# Patient Record
Sex: MALE | Race: WHITE | NOT HISPANIC OR LATINO | Employment: FULL TIME | URBAN - NONMETROPOLITAN AREA
[De-identification: names, ages, dates, MRNs, and addresses within clinical notes are randomized per-mention and may not be internally consistent; named-entity substitution may affect disease eponyms.]

---

## 2018-02-04 ENCOUNTER — HISTORY (OUTPATIENT)
Dept: EMERGENCY MEDICINE | Facility: OTHER | Age: 28
End: 2018-02-04

## 2019-07-18 ENCOUNTER — TELEPHONE (OUTPATIENT)
Dept: FAMILY MEDICINE | Facility: OTHER | Age: 29
End: 2019-07-18

## 2019-07-18 ENCOUNTER — OFFICE VISIT (OUTPATIENT)
Dept: FAMILY MEDICINE | Facility: OTHER | Age: 29
End: 2019-07-18
Attending: NURSE PRACTITIONER
Payer: COMMERCIAL

## 2019-07-18 VITALS
TEMPERATURE: 97.7 F | RESPIRATION RATE: 16 BRPM | WEIGHT: 203.7 LBS | OXYGEN SATURATION: 97 % | HEART RATE: 66 BPM | HEIGHT: 75 IN | DIASTOLIC BLOOD PRESSURE: 72 MMHG | SYSTOLIC BLOOD PRESSURE: 120 MMHG | BODY MASS INDEX: 25.33 KG/M2

## 2019-07-18 DIAGNOSIS — L08.9 CAT BITE OF RIGHT HAND WITH INFECTION, INITIAL ENCOUNTER: Primary | ICD-10-CM
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
## 2019-07-18 DIAGNOSIS — S61.451A CAT BITE OF RIGHT HAND WITH INFECTION, INITIAL ENCOUNTER: Primary | ICD-10-CM

## 2019-07-18 LAB
ANION GAP SERPL CALCULATED.3IONS-SCNC: 10 MMOL/L (ref 3–14)
BASOPHILS # BLD AUTO: 0 10E9/L (ref 0–0.2)
BASOPHILS NFR BLD AUTO: 0.4 %
BUN SERPL-MCNC: 19 MG/DL (ref 7–25)
CALCIUM SERPL-MCNC: 9.5 MG/DL (ref 8.6–10.3)
CHLORIDE SERPL-SCNC: 101 MMOL/L (ref 98–107)
CO2 SERPL-SCNC: 26 MMOL/L (ref 21–31)
CREAT SERPL-MCNC: 1.11 MG/DL (ref 0.7–1.3)
CRP SERPL-MCNC: 0.2 MG/L
DIFFERENTIAL METHOD BLD: NORMAL
EOSINOPHIL # BLD AUTO: 0 10E9/L (ref 0–0.7)
EOSINOPHIL NFR BLD AUTO: 0.4 %
ERYTHROCYTE [DISTWIDTH] IN BLOOD BY AUTOMATED COUNT: 12.2 % (ref 10–15)
GFR SERPL CREATININE-BSD FRML MDRD: 77 ML/MIN/{1.73_M2}
GLUCOSE SERPL-MCNC: 121 MG/DL (ref 70–105)
HCT VFR BLD AUTO: 44.9 % (ref 40–53)
HGB BLD-MCNC: 16.3 G/DL (ref 13.3–17.7)
IMM GRANULOCYTES # BLD: 0 10E9/L (ref 0–0.4)
IMM GRANULOCYTES NFR BLD: 0.3 %
LYMPHOCYTES # BLD AUTO: 1.6 10E9/L (ref 0.8–5.3)
LYMPHOCYTES NFR BLD AUTO: 21.8 %
MCH RBC QN AUTO: 32.4 PG (ref 26.5–33)
MCHC RBC AUTO-ENTMCNC: 36.3 G/DL (ref 31.5–36.5)
MCV RBC AUTO: 89 FL (ref 78–100)
MONOCYTES # BLD AUTO: 0.5 10E9/L (ref 0–1.3)
MONOCYTES NFR BLD AUTO: 6.2 %
NEUTROPHILS # BLD AUTO: 5.2 10E9/L (ref 1.6–8.3)
NEUTROPHILS NFR BLD AUTO: 70.9 %
PLATELET # BLD AUTO: 211 10E9/L (ref 150–450)
POTASSIUM SERPL-SCNC: 3.9 MMOL/L (ref 3.5–5.1)
RBC # BLD AUTO: 5.03 10E12/L (ref 4.4–5.9)
SODIUM SERPL-SCNC: 137 MMOL/L (ref 134–144)
WBC # BLD AUTO: 7.3 10E9/L (ref 4–11)

## 2019-07-18 PROCEDURE — 36415 COLL VENOUS BLD VENIPUNCTURE: CPT | Mod: ZL | Performed by: NURSE PRACTITIONER

## 2019-07-18 PROCEDURE — 85025 COMPLETE CBC W/AUTO DIFF WBC: CPT | Mod: ZL | Performed by: NURSE PRACTITIONER

## 2019-07-18 PROCEDURE — 80048 BASIC METABOLIC PNL TOTAL CA: CPT | Mod: ZL | Performed by: NURSE PRACTITIONER

## 2019-07-18 PROCEDURE — 86140 C-REACTIVE PROTEIN: CPT | Mod: ZL | Performed by: NURSE PRACTITIONER

## 2019-07-18 PROCEDURE — 99214 OFFICE O/P EST MOD 30 MIN: CPT | Performed by: NURSE PRACTITIONER

## 2019-07-18 RX ORDER — DOXYCYCLINE 100 MG/1
100 CAPSULE ORAL 2 TIMES DAILY
Qty: 20 CAPSULE | Refills: 0 | Status: SHIPPED | OUTPATIENT
Start: 2019-07-18 | End: 2019-07-28

## 2019-07-18 RX ORDER — METRONIDAZOLE 500 MG/1
500 TABLET ORAL 3 TIMES DAILY
Qty: 30 TABLET | Refills: 0 | Status: SHIPPED | OUTPATIENT
Start: 2019-07-18 | End: 2019-07-28

## 2019-07-18 ASSESSMENT — PAIN SCALES - GENERAL: PAINLEVEL: MODERATE PAIN (4)

## 2019-07-18 ASSESSMENT — MIFFLIN-ST. JEOR: SCORE: 1964.61

## 2019-07-18 NOTE — NURSING NOTE
Patient in clinic for cat bite last night.  Tx by cleaning wound and applying A and D ointment.  Amrita Mathews LPN....................  7/18/2019   12:52 PM    Chief Complaint   Patient presents with     Cat Bite       Medication Reconciliation: complete    Amrita Mathews LPN

## 2019-07-18 NOTE — PATIENT INSTRUCTIONS
Low threshold to follow up. If worsening in 24-48 hours come back    Hot packs, rest, elevation    Start antibiotics as soon as you get them.      Cellulitis - Take the antibiotic as prescribed. Antibiotic has been sent to pharmacy. Please take full course of antibiotic even if symptoms have completely resolved. This helps prevent against antibiotic resistance.     Watch for any signs of increasing infection (redness, pus, increased pain (may be along the tendon and back of hand if the hand involved), increased swelling or fever). Call if any of these signs occur. Monitor for fevers or chills.    Wash the wound in warm water with antibacterial soap for 5-10 minutes at a time two to three times per day until healing is established. For puncture wounds limit antibiotic ointments. You can keep it covered with a bandage in order to decrease infection concerns.       Call or return to clinic as needed if your symptoms worsen or fail to improve as anticipated.

## 2019-07-18 NOTE — PROGRESS NOTES
Anesthesia Volume In Cc: 0 Nursing Notes:   Amrita Mathews LPN  7/18/2019  1:00 PM  Signed  Patient in clinic for cat bite last night.  Tx by cleaning wound and applying A and D ointment.  Amrita Mathews LPN....................  7/18/2019   12:52 PM    Chief Complaint   Patient presents with     Cat Bite       Medication Reconciliation: complete    Amrita Mathews LPN      SUBJECTIVE:   Kb Parnell is a 29 year old male who presents to clinic today for the following health issues:    Patient presents to the rapid clinic for a cat bite.  Last evening around 6 PM he was bit by his brother's cat.  Is a known cat to him.  The attack was provoked, the animal is hesitant to be around humans and he hides out underneath the couch.  The patient reached his arm in under the couch and the cat bit him.  The patient notes that the cat had had some shots or immunizations however the cat is never outside.  He washed the wound with soap and water and applied A&D over the sites.  He now presents with swelling, pain, increased redness to the dorsal surface of the hand.  He is able to flex and extend his fingers but they are painful.  He denies fevers or chills.      Problem list and histories reviewed & adjusted, as indicated.  Additional history: as documented    There is no problem list on file for this patient.    Past Surgical History:   Procedure Laterality Date     OTHER SURGICAL HISTORY      2002,600000,OTHER,involving closed reduction       Social History     Tobacco Use     Smoking status: Never Smoker     Smokeless tobacco: Former User   Substance Use Topics     Alcohol use: Yes     Family History   Problem Relation Age of Onset     Heart Disease Father 46        Heart Disease,MI     Hypertension Mother         Hypertension     Other - See Comments Mother         Psychiatric illness,Anxiety     Diabetes No family hx of         Diabetes     Cancer No family hx of         Cancer         Current Outpatient Medications   Medication Sig Dispense  "Refill     doxycycline hyclate (VIBRAMYCIN) 100 MG capsule Take 1 capsule (100 mg) by mouth 2 times daily for 10 days 20 capsule 0     metroNIDAZOLE (FLAGYL) 500 MG tablet Take 1 tablet (500 mg) by mouth 3 times daily for 10 days 30 tablet 0     Allergies   Allergen Reactions     Penicillins Anxiety     Nickel      metal         ROS:  Notable findings in the HPI.       OBJECTIVE:     /72 (BP Location: Left arm, Patient Position: Sitting, Cuff Size: Adult Regular)   Pulse 66   Temp 97.7  F (36.5  C) (Tympanic)   Resp 16   Ht 1.905 m (6' 3\")   Wt 92.4 kg (203 lb 11.2 oz)   SpO2 97%   BMI 25.46 kg/m    Body mass index is 25.46 kg/m .  GENERAL: healthy, alert and no distress  EYES: Eyes grossly normal to inspection  HENT: normal cephalic/atraumatic and oral mucous membranes moist  RESP: Without increased work of breathing  CV: regular rates and rhythm and no peripheral edema  MS: Right hand: He is able to flex, extend his wrist.  He is able to pronate and supinate the wrist.  He is able to flex and extend his pointer, middle, ring of fingers against resistance without pain or problems.  SKIN:  Right hand: Dorsal surface has several puncture wounds, there is an 8-1/2 cm red, swollen area.  See pictures for further description.  NEURO: Normal strength and tone, mentation intact and speech normal  PSYCH: mentation appears normal, affect normal/bright              Diagnostic Test Results:  Results for orders placed or performed in visit on 07/18/19 (from the past 24 hour(s))   CBC with platelets differential   Result Value Ref Range    WBC 7.3 4.0 - 11.0 10e9/L    RBC Count 5.03 4.4 - 5.9 10e12/L    Hemoglobin 16.3 13.3 - 17.7 g/dL    Hematocrit 44.9 40.0 - 53.0 %    MCV 89 78 - 100 fl    MCH 32.4 26.5 - 33.0 pg    MCHC 36.3 31.5 - 36.5 g/dL    RDW 12.2 10.0 - 15.0 %    Platelet Count 211 150 - 450 10e9/L    Diff Method Automated Method     % Neutrophils 70.9 %    % Lymphocytes 21.8 %    % Monocytes 6.2 %    % " Total Volume (Ccs): 1 Add Intralesional Injection: No Eosinophils 0.4 %    % Basophils 0.4 %    % Immature Granulocytes 0.3 %    Absolute Neutrophil 5.2 1.6 - 8.3 10e9/L    Absolute Lymphocytes 1.6 0.8 - 5.3 10e9/L    Absolute Monocytes 0.5 0.0 - 1.3 10e9/L    Absolute Eosinophils 0.0 0.0 - 0.7 10e9/L    Absolute Basophils 0.0 0.0 - 0.2 10e9/L    Abs Immature Granulocytes 0.0 0 - 0.4 10e9/L   Basic metabolic panel   Result Value Ref Range    Sodium 137 134 - 144 mmol/L    Potassium 3.9 3.5 - 5.1 mmol/L    Chloride 101 98 - 107 mmol/L    Carbon Dioxide 26 21 - 31 mmol/L    Anion Gap 10 3 - 14 mmol/L    Glucose 121 (H) 70 - 105 mg/dL    Urea Nitrogen 19 7 - 25 mg/dL    Creatinine 1.11 0.70 - 1.30 mg/dL    GFR Estimate 77 >60 mL/min/[1.73_m2]    GFR Estimate If Black >90 >60 mL/min/[1.73_m2]    Calcium 9.5 8.6 - 10.3 mg/dL   CRP inflammation   Result Value Ref Range    CRP Inflammation 0.2 <0.5 mg/L       ASSESSMENT/PLAN:     1. Cat bite of right hand with infection, initial encounter  - CBC with platelets differential  - Basic metabolic panel  - CRP inflammation  - doxycycline hyclate (VIBRAMYCIN) 100 MG capsule; Take 1 capsule (100 mg) by mouth 2 times daily for 10 days  Dispense: 20 capsule; Refill: 0  - metroNIDAZOLE (FLAGYL) 500 MG tablet; Take 1 tablet (500 mg) by mouth 3 times daily for 10 days  Dispense: 30 tablet; Refill: 0    Medical Decision Making:    Differential Diagnosis:  Rash: Cellulitis  Eczema  Cat bite with infection    Serious Comorbid Conditions:  Adult:  None    PLAN:    Did explain to the patient that cat bites can get out of hand.  It has not yet been 24 hours and here he has the start of an infection.  Will get him started on doxycycline with coadministration of Flagyl as he is allergic to penicillin.  Will have him wash the wound with antibacterial soap, limit antibiotic ointments as this can prevent the puncture wounds from draining.  He can also use hot compresses and elevation, ibuprofen and Tylenol for pain.  If things significantly worsen  Total Time In Minutes: 3 minutes in the next 24 to 48 hours he will return to the ER or rapid clinic.  Otherwise he will follow-up as needed.    Followup:    If not improving or if condition worsens, follow up with your Primary Care Provider    I explained my diagnostic considerations and recommendations to the patient, who voiced understanding and agreement with the treatment plan. All questions were answered. We discussed potential side effects of any prescribed or recommended therapies, as well as expectations for response to treatments.  He was advised to contact our office if there is no improvement or worsening of conditions or symptoms.  If s/s worsen or persist, patient will either come back or follow up with PCP.    Disclaimer:  This note consists of words and symbols derived from keyboarding, dictation, or using voice recognition software. As a result, there may be errors in the script that have gone undetected. Please consider this when interpreting information found in this note.      Radha Whitaker NP, 7/18/2019 1:00 PM          Consent was obtained from the patient. The risks and benefits to therapy were discussed in detail. Specifically, the risks of infection, scarring, bleeding, prolonged wound healing, incomplete removal, allergy to anesthesia, nerve injury and recurrence were addressed. Alternatives to liquid nitrogen, such as ED&C, surgical removal, XRT were also discussed.  Prior to the procedure, the treatment site was clearly identified and confirmed by the patient. All components of Universal Protocol/PAUSE Rule completed. Additional Information: (Optional): The wound was cleaned, and a dressing was applied.  The patient received detailed post-op instructions. Size Of Lesion In Cm: 1.3 Pre-Procedure: The surgical site was antiseptically prepared. Number Of Freeze-Thaw Cycles: 2 freeze-thaw cycles Post-Care Instructions: I reviewed with the patient in detail post-care instructions. Patient is to keep the area dry for 48 hours, and not to engage in any heavy lifting, exercise, or swimming for the next 14 days. Should the patient develop any fevers, chills, bleeding, severe pain patient will contact the office immediately. Bill As A Line Item Or As Units: Line Item Medication Injected: 5-Fluorouracil Detail Level: Detailed Render Post-Care In The Note: Yes

## 2020-02-12 ENCOUNTER — MYC MEDICAL ADVICE (OUTPATIENT)
Dept: FAMILY MEDICINE | Facility: OTHER | Age: 30
End: 2020-02-12

## 2020-02-12 ENCOUNTER — OFFICE VISIT (OUTPATIENT)
Dept: FAMILY MEDICINE | Facility: OTHER | Age: 30
End: 2020-02-12
Attending: NURSE PRACTITIONER
Payer: COMMERCIAL

## 2020-02-12 VITALS
WEIGHT: 193.38 LBS | HEIGHT: 75 IN | DIASTOLIC BLOOD PRESSURE: 68 MMHG | SYSTOLIC BLOOD PRESSURE: 116 MMHG | RESPIRATION RATE: 20 BRPM | TEMPERATURE: 97.9 F | HEART RATE: 93 BPM | BODY MASS INDEX: 24.05 KG/M2 | OXYGEN SATURATION: 97 %

## 2020-02-12 DIAGNOSIS — R00.2 HEART PALPITATIONS: Primary | ICD-10-CM

## 2020-02-12 DIAGNOSIS — F41.1 GENERALIZED ANXIETY DISORDER: ICD-10-CM

## 2020-02-12 LAB
ANION GAP SERPL CALCULATED.3IONS-SCNC: 5 MMOL/L (ref 3–14)
BUN SERPL-MCNC: 16 MG/DL (ref 7–25)
CALCIUM SERPL-MCNC: 9.9 MG/DL (ref 8.6–10.3)
CHLORIDE SERPL-SCNC: 102 MMOL/L (ref 98–107)
CO2 SERPL-SCNC: 30 MMOL/L (ref 21–31)
CREAT SERPL-MCNC: 1.14 MG/DL (ref 0.7–1.3)
GFR SERPL CREATININE-BSD FRML MDRD: 75 ML/MIN/{1.73_M2}
GLUCOSE SERPL-MCNC: 91 MG/DL (ref 70–105)
INTERPRETATION ECG - MUSE: NORMAL
MAGNESIUM SERPL-MCNC: 2.1 MG/DL (ref 1.9–2.7)
POTASSIUM SERPL-SCNC: 4.7 MMOL/L (ref 3.5–5.1)
SODIUM SERPL-SCNC: 137 MMOL/L (ref 134–144)

## 2020-02-12 PROCEDURE — 36415 COLL VENOUS BLD VENIPUNCTURE: CPT | Mod: ZL | Performed by: NURSE PRACTITIONER

## 2020-02-12 PROCEDURE — 99214 OFFICE O/P EST MOD 30 MIN: CPT | Performed by: NURSE PRACTITIONER

## 2020-02-12 PROCEDURE — 83735 ASSAY OF MAGNESIUM: CPT | Mod: ZL | Performed by: NURSE PRACTITIONER

## 2020-02-12 PROCEDURE — 93000 ELECTROCARDIOGRAM COMPLETE: CPT | Performed by: INTERNAL MEDICINE

## 2020-02-12 PROCEDURE — 80048 BASIC METABOLIC PNL TOTAL CA: CPT | Mod: ZL | Performed by: NURSE PRACTITIONER

## 2020-02-12 RX ORDER — DILTIAZEM HYDROCHLORIDE 30 MG/1
30 TABLET, FILM COATED ORAL 4 TIMES DAILY PRN
Qty: 30 TABLET | Refills: 0 | Status: SHIPPED | OUTPATIENT
Start: 2020-02-12 | End: 2022-04-14

## 2020-02-12 ASSESSMENT — PATIENT HEALTH QUESTIONNAIRE - PHQ9
SUM OF ALL RESPONSES TO PHQ QUESTIONS 1-9: 7
5. POOR APPETITE OR OVEREATING: MORE THAN HALF THE DAYS

## 2020-02-12 ASSESSMENT — ANXIETY QUESTIONNAIRES
2. NOT BEING ABLE TO STOP OR CONTROL WORRYING: SEVERAL DAYS
IF YOU CHECKED OFF ANY PROBLEMS ON THIS QUESTIONNAIRE, HOW DIFFICULT HAVE THESE PROBLEMS MADE IT FOR YOU TO DO YOUR WORK, TAKE CARE OF THINGS AT HOME, OR GET ALONG WITH OTHER PEOPLE: SOMEWHAT DIFFICULT
5. BEING SO RESTLESS THAT IT IS HARD TO SIT STILL: SEVERAL DAYS
7. FEELING AFRAID AS IF SOMETHING AWFUL MIGHT HAPPEN: SEVERAL DAYS
6. BECOMING EASILY ANNOYED OR IRRITABLE: NOT AT ALL
GAD7 TOTAL SCORE: 9
1. FEELING NERVOUS, ANXIOUS, OR ON EDGE: MORE THAN HALF THE DAYS
3. WORRYING TOO MUCH ABOUT DIFFERENT THINGS: MORE THAN HALF THE DAYS

## 2020-02-12 ASSESSMENT — ENCOUNTER SYMPTOMS
PALPITATIONS: 1
CHILLS: 0
FEVER: 0

## 2020-02-12 ASSESSMENT — PAIN SCALES - GENERAL: PAINLEVEL: NO PAIN (0)

## 2020-02-12 ASSESSMENT — MIFFLIN-ST. JEOR: SCORE: 1917.77

## 2020-02-12 NOTE — PROGRESS NOTES
"  SUBJECTIVE:   Kb Parnell is a 29 year old male who presents to clinic today for the following health issues:    HPI  Patient presents for evaluation of heart palpitations and elevated blood pressures. Reviewing some of his history he has been seen multiple times for palpitations. He has had normal EKGs, and chest x-ray showed normal cardiac silhouette. Had a 48 hour holter monitor completed on 12/23/19 at [facility] and showed no ventricular ectopy and normal sinus rhythm with heart rate average . Lab work also completed and showed normal TSH, HgbA1C, CBC and BMP. He notes family history with father and Grandpa passing away from heart attacks. He notes anxiety diagnosis and has been under a lot of stress with work. He reports recent stressor including  from his SO and work. He also notes his brother has depression so he is always trying to help him out.   Palpitations seemed more pronounced in the last week. He was sleeping and felt his heart quiver. He notes being hyper aware of heart sensations. \"It doesn't beat fast, but beats hard and I can feel it in my chest cavity.\" Nothing makes it worse. Nothing improves it. He is exercising daily. Notes that he went to the gym this morning and tolerated activity without symptoms. He notes decreasing his caffeine intake to once cup of coffee daily. Reports 1-2 beers 2-3 times a week. No smoking. No other illicit drug use. He is a / and notes though he loves his work, but it is a big stressor. He was previously on Celexa for anxiety and notes it helped overall, but does not want to go back on medication. Thinks overall anxiety is better controlled than the past. He often goes on [employer] to look at symptoms and makes him more anxious. He notes thoughts of suicide at times, but no plan or previous attempts. He just started seeing a counselor at [place], found it helpful and will be going every other week.   He got a wrist blood pressure " "cuff this week. He notes a high reading of 160/101. Notes blood pressure readings have been all over the place. It is normal in the clinic today @ 116/68.      There are no active problems to display for this patient.    History reviewed. No pertinent past medical history.   Past Surgical History:   Procedure Laterality Date     OTHER SURGICAL HISTORY      2002,600000,OTHER,involving closed reduction       Review of Systems   Constitutional: Negative for chills and fever.   Cardiovascular: Positive for palpitations. Negative for chest pain and peripheral edema.        OBJECTIVE:     /68 (BP Location: Right arm, Patient Position: Sitting, Cuff Size: Adult Regular)   Pulse 93   Temp 97.9  F (36.6  C) (Tympanic)   Resp 20   Ht 1.905 m (6' 3\")   Wt 87.7 kg (193 lb 6 oz)   SpO2 97%   BMI 24.17 kg/m    Body mass index is 24.17 kg/m .  Physical Exam  Constitutional:       Appearance: Normal appearance. He is normal weight.   HENT:      Mouth/Throat:      Mouth: Mucous membranes are moist.   Neck:      Musculoskeletal: Neck supple. No muscular tenderness.   Cardiovascular:      Rate and Rhythm: Regular rhythm. Tachycardia present.      Pulses: Normal pulses.      Heart sounds: No murmur. No friction rub.   Pulmonary:      Effort: Pulmonary effort is normal.      Breath sounds: Normal breath sounds.   Skin:     General: Skin is warm and dry.   Neurological:      Mental Status: He is alert.       Diagnostic Test Results:  Results for orders placed or performed in visit on 02/12/20 (from the past 24 hour(s))   EKG 12-lead, tracing only   Result Value Ref Range    Interpretation ECG Click View Image link to view waveform and result    Basic Metabolic Panel   Result Value Ref Range    Sodium 137 134 - 144 mmol/L    Potassium 4.7 3.5 - 5.1 mmol/L    Chloride 102 98 - 107 mmol/L    Carbon Dioxide 30 21 - 31 mmol/L    Anion Gap 5 3 - 14 mmol/L    Glucose 91 70 - 105 mg/dL    Urea Nitrogen 16 7 - 25 mg/dL    Creatinine " 1.14 0.70 - 1.30 mg/dL    GFR Estimate 75 >60 mL/min/[1.73_m2]    GFR Estimate If Black >90 >60 mL/min/[1.73_m2]    Calcium 9.9 8.6 - 10.3 mg/dL   Magnesium   Result Value Ref Range    Magnesium 2.1 1.9 - 2.7 mg/dL       ASSESSMENT/PLAN:   1. Heart palpitations  We discussed at length next steps. I discussed that it is reassuring that previous work up has not pointed to a cardiac cause. It is likely this is anxiety driven. EKG NSR and electrolytes today were normal. We opted to not repeat TSH or CBC as this was completed less than 2 months ago. We will try a small dose of cardizem when patient is feeling symptomatic with heart palpitations. Start with a small dose of 15 mg when symptomatic can repeat up to 4 times a day or every 6 hours. Patient wanted to hold up on further cardiac testing (MCOT for 2 weeks, echo, or cardiology consult) at this time due to cost. He will monitor his symptoms closely at home. I also suggested getting a BP device that is not on his wrist, but for his upper arm. As I think he may be getting false readings. He will check this once a day and let me know his results next week. I encouraged him to establish care with a provider so we can better manage his healthcare. Continue with exercise. Watch caffeine intake. Monitor stress. Follow-up in 4 weeks to see how cardizem is working.   - Magnesium; Future  - Basic Metabolic Panel; Future  - EKG 12-lead, tracing only  - diltiazem (CARDIZEM) 30 MG tablet; Take 1 tablet (30 mg) by mouth 4 times daily as needed (heart palpitations.)  Dispense: 30 tablet; Refill: 0  - Basic Metabolic Panel  - Magnesium    2. Generalized anxiety disorder  Continue with counseling. We discussed when palpitations develop to focus on his breathing and less on his heart. We discussed other stress relieving modalities to help with anxiety. He is not interested in medication at this time.     I spent approximately 25 minutes with the patient (exclusive of separately  billed services/procedures), with greater than 50% spent in counseling, prognosis, risks and benefits of management or follow-up.  Reviewed importance of compliance with chosen treatment options and follow-up.  Risk factor reduction and patient education and coordinating care, establishing and/or reviewing the patient's medical record also completed during today's exam .      Antionette Ahn [facility] AND [facility]

## 2020-02-12 NOTE — NURSING NOTE
Patient presents to clinic experiencing hypertension and heart palpitations.  He states he wore a heart monitor at [facility] 3 months ago and results were alright.    Medication Reconciliation: complete    Eli Davis LPN

## 2020-02-13 ASSESSMENT — ANXIETY QUESTIONNAIRES: GAD7 TOTAL SCORE: 9

## 2020-02-13 NOTE — TELEPHONE ENCOUNTER
Order placed for next week placement of device. We will call to schedule. Also should try the cardizem if symptomatic to see if symptoms improve.

## 2020-02-13 NOTE — TELEPHONE ENCOUNTER
Notified patient of below response per Antionette Ahn NP and he verbally understood.    Let patient know via phone call.     Not likely a lung issue based on your symptoms. Usually lung issues worsen with a deep breath, Previous chest x-ray from 12/2020 showed clear lungs is reassuring.   TAVIA Shah LPN............2/13/2020 4:16 PM    
[Patient] : patient

## 2020-02-13 NOTE — TELEPHONE ENCOUNTER
Patient notified of response below per Antionette Ahn NP.  He will return to [place] next Thursday 02/20/20 and would like to follow up and schedule Cardiac Holter monitor at that time.    It sounds like he will be leaving in two days for [place]. If he doesn't want to wear the heart monitor while in [place] I recommend that he have it placed when he gets back, so we can get more information than just 2 days. Let me know his response and I can order the study.   Antionette Ahn NP     Medication Reconciliation: complete    Eli Davis LPN

## 2020-03-11 ENCOUNTER — HEALTH MAINTENANCE LETTER (OUTPATIENT)
Age: 30
End: 2020-03-11

## 2020-07-01 ENCOUNTER — VIRTUAL VISIT (OUTPATIENT)
Dept: FAMILY MEDICINE | Facility: OTHER | Age: 30
End: 2020-07-01
Attending: FAMILY MEDICINE
Payer: COMMERCIAL

## 2020-07-01 DIAGNOSIS — J02.9 SORE THROAT: Primary | ICD-10-CM

## 2020-07-01 PROCEDURE — 99212 OFFICE O/P EST SF 10 MIN: CPT | Mod: 95 | Performed by: FAMILY MEDICINE

## 2020-07-01 ASSESSMENT — PAIN SCALES - GENERAL: PAINLEVEL: NO PAIN (0)

## 2020-07-01 NOTE — NURSING NOTE
"Chief Complaint   Patient presents with     Pharyngitis     Patient was vomiting yesterday and developed a sore throat today. He is unsure if it could be heat related or if he should be tested for covid.    Initial There were no vitals taken for this visit. Estimated body mass index is 24.17 kg/m  as calculated from the following:    Height as of 2/12/20: 1.905 m (6' 3\").    Weight as of 2/12/20: 87.7 kg (193 lb 6 oz).    Medication Reconciliation: complete      Phoebe Ballesteros LPN  "

## 2020-07-01 NOTE — PROGRESS NOTES
"Kb Parnell is a 29 year old male who is being evaluated via a billable telephone visit.      The patient has been notified of following: Phoebe Ballesteros LPN .......  7/1/2020  1:38 PM      \"This telephone visit will be conducted via a call between you and your physician/provider. We have found that certain health care needs can be provided without the need for a physical exam.  This service lets us provide the care you need with a short phone conversation.  If a prescription is necessary we can send it directly to your pharmacy.  If lab work is needed we can place an order for that and you can then stop by our lab to have the test done at a later time.    Telephone visits are billed at different rates depending on your insurance coverage. During this emergency period, for some insurers they may be billed the same as an in-person visit.  Please reach out to your insurance provider with any questions.    If during the course of the call the physician/provider feels a telephone visit is not appropriate, you will not be charged for this service.\"    Patient has given verbal consent for Telephone visit?  Yes    What phone number would you like to be contacted at? [phone]    How would you like to obtain your AVS? Karolyn Palomares     Kb Parnell is a 29 year old male who presents via phone visit today for the following health issues:    HPI patient calls concerned about cold symptoms.  Few days ago he was out in the sun quite a bit became very hot.  Couple days later he developed a sore throat.  Little nausea.  Vomited once.  Otherwise he has no other symptoms.  No temperatures greater than 100 worsening cough difficulty breathing or myalgias.  Initially did have a headache after he been outside in the sun but that has since resolved no chest pain diarrhea or loss of taste.                 Reviewed and updated as needed this visit by Provider         Review of Systems          Objective   Reported " vitals:  There were no vitals taken for this visit.     PSYCH: Alert and oriented times 3; coherent speech, normal   rate and volume, able to articulate logical thoughts, able   to abstract reason, no tangential thoughts, no hallucinations   or delusions  His affect is   RESP: No cough, no audible wheezing, able to talk in full sentences  Remainder of exam unable to be completed due to telephone visits            Assessment/Plan:  1. Sore throat  I advised the patient that he needs to criteria for COVID testing.  Currently cold with testing is not indicated according to the most recent recommendations.  He is to call if he should develop any other symptoms.      No follow-ups on file.      Phone call duration: 6 minutes    Obi Fuentes MD

## 2020-09-14 ENCOUNTER — OFFICE VISIT (OUTPATIENT)
Dept: FAMILY MEDICINE | Facility: OTHER | Age: 30
End: 2020-09-14
Attending: PHYSICIAN ASSISTANT
Payer: COMMERCIAL

## 2020-09-14 VITALS
BODY MASS INDEX: 23.87 KG/M2 | RESPIRATION RATE: 18 BRPM | TEMPERATURE: 97.8 F | SYSTOLIC BLOOD PRESSURE: 134 MMHG | HEART RATE: 51 BPM | DIASTOLIC BLOOD PRESSURE: 80 MMHG | WEIGHT: 191 LBS | OXYGEN SATURATION: 94 %

## 2020-09-14 DIAGNOSIS — R00.2 PALPITATIONS: Primary | ICD-10-CM

## 2020-09-14 DIAGNOSIS — F41.9 ANXIETY: ICD-10-CM

## 2020-09-14 DIAGNOSIS — K21.9 GASTROESOPHAGEAL REFLUX DISEASE, ESOPHAGITIS PRESENCE NOT SPECIFIED: ICD-10-CM

## 2020-09-14 PROBLEM — F63.3 TRICHOTILLOMANIA: Status: ACTIVE | Noted: 2017-11-17

## 2020-09-14 PROCEDURE — 99213 OFFICE O/P EST LOW 20 MIN: CPT | Performed by: PHYSICIAN ASSISTANT

## 2020-09-14 ASSESSMENT — ANXIETY QUESTIONNAIRES
GAD7 TOTAL SCORE: 8
1. FEELING NERVOUS, ANXIOUS, OR ON EDGE: MORE THAN HALF THE DAYS
2. NOT BEING ABLE TO STOP OR CONTROL WORRYING: SEVERAL DAYS
6. BECOMING EASILY ANNOYED OR IRRITABLE: NOT AT ALL
5. BEING SO RESTLESS THAT IT IS HARD TO SIT STILL: SEVERAL DAYS
IF YOU CHECKED OFF ANY PROBLEMS ON THIS QUESTIONNAIRE, HOW DIFFICULT HAVE THESE PROBLEMS MADE IT FOR YOU TO DO YOUR WORK, TAKE CARE OF THINGS AT HOME, OR GET ALONG WITH OTHER PEOPLE: SOMEWHAT DIFFICULT
3. WORRYING TOO MUCH ABOUT DIFFERENT THINGS: SEVERAL DAYS
7. FEELING AFRAID AS IF SOMETHING AWFUL MIGHT HAPPEN: SEVERAL DAYS

## 2020-09-14 ASSESSMENT — PAIN SCALES - GENERAL: PAINLEVEL: MILD PAIN (2)

## 2020-09-14 ASSESSMENT — PATIENT HEALTH QUESTIONNAIRE - PHQ9
SUM OF ALL RESPONSES TO PHQ QUESTIONS 1-9: 2
5. POOR APPETITE OR OVEREATING: MORE THAN HALF THE DAYS

## 2020-09-14 ASSESSMENT — MIFFLIN-ST. JEOR: SCORE: 1902

## 2020-09-14 NOTE — PROGRESS NOTES
SUBJECTIVE:   Kb Parnell is a 30 year old male here for the following health issues:    HPI  Patient presents clinic for evaluation of epigastric discomfort/chest heaviness and possible palpitations.  He describes a sensation of his heart fluttering for a brief moment perhaps 1 or 2 beats followed by a sensation of chest heaviness and anxiety.  He states that his palpitations occur approximately twice a day.  He does have a history of anxiety in the past and was on antidepressants as well as benzodiazepines for anxiety for about a year and a half.  He has been off the medications for nearly a year and does not feel this is anxiety although he has noted increased stress at work and due to the COVID-19 pandemic.  He also describes a metallic taste in his mouth after larger meals and especially when eating late at night such as 9 PM and going to bed at 930 or 10:00.  He reports worsening of his chest heaviness and a sensation of needing to take a deep breath while laying supine at night.  He has not tried Tums or any acid suppression medications.  He has not noted any dyspnea/cyanosis although states sometimes he feels the need to take a deep breath.  He denies any dizziness, vertigo, or tachycardia.  He states he wore a heart monitor through [facility] in the remote past however it kept falling off and the results were thought to be reassuring but inconclusive.  He did not follow-up with cardiology.  He has had no numbness or tingling in the extremities.  He denies any symptoms when he is exercising such as running.  He denies any recent sick contacts, fevers, chills, night sweats, change in his weight, abdominal pain or pressure, or back pain.  He states his symptoms are worse especially if he drinks several beers such as 5 or 6 later in the evening.  He has also noted waking up at approximately 3 PM and difficulty going back to sleep as his mind is racing and worried about his epigastric discomfort and what it  "might mean.  He states his sleep is much improved if he exercises more, does yoga, and eats less processed foods.    GAD7  MAXWELL-7 SCORE 2/12/2020 9/14/2020   Total Score 9 8       PHQ9  PHQ 2/12/2020 9/14/2020   PHQ-9 Total Score 7 2   Q9: Thoughts of better off dead/self-harm past 2 weeks Several days Not at all       Allergies:  Allergies   Allergen Reactions     Penicillins Anxiety     Nickel      metal       Review of Systems   As above otherwise ROS is unremarkable.     OBJECTIVE:     Vitals:    09/14/20 1524   BP: 134/80   BP Location: Right arm   Patient Position: Sitting   Cuff Size: Adult Large   Pulse: 51   Resp: 18   Temp: 97.8  F (36.6  C)   TempSrc: Tympanic   SpO2: 94%   Weight: 86.6 kg (191 lb)   Height: (P) 1.905 m (6' 3\")       Physical Exam  General Appearance: Pleasant, alert, appropriate appearance for age and circumstances, no acute distress  Head: Normocephalic, atraumatic  Eyes: PERRL, EOMI  Ears: TM's pearly gray and intact bilaterally. Normal auditory canals and external ears   OroPharynx: Dental hygiene adequate. Normal buccal mucosa. Normal pharynx. No exudates or petechia noted.  Neck: Supple, no masses or lymphadenopathy. Thyroid smooth and rubbery in texture without palpable nodules  Lungs: Normal chest wall and respirations. Clear to auscultation, no wheezes, rales, rhonchi, or stridor  Cardiovascular: Bradycardic at a rate of 51, regular rhythm. S1 and S2 audible, no murmurs, clicks, rubs, or gallops  Gastrointestinal: Abd symmetrical, soft, nontender, no masses, guarding, or tympany, normoactive bowel sounds  Psychiatric Exam: Alert and oriented, appropriate affect    ASSESSMENT/PLAN:     1. Palpitations    2. Gastroesophageal reflux disease, esophagitis presence not specified    3. Anxiety        I suspect his symptoms are likely it is a mix of anxiety acid reflux, and possible palpitations/bradycardic episodes.  I believe with a few diet and lifestyle changes most of these symptoms " will improve.    For his palpitations, as he was bradycardic at a rate of 51 today, I recommended a cardiac monitor to be worn for 48 hours.     For his anxiety we discussed in length possibly restarting an antidepressant such as an SSRI.  He states he still has some lorazepam at home but he feels he has not needed to use it.  He stated he would prefer not to restart any antidepressants or antianxiety medications at this time.  He states he will increase his yoga and exercise.  He will monitor symptoms and follow-up if needed.    For his acid reflux I suggested either starting a PPI, trying Tums, or changing his diet, especially not eating so late at night.  Patient states he wants to first change his diet.  Currently his caffeine intake is minimal however he states he will cut out all caffeine.  He does not do any energy drinks.  He also will eat less spicy foods.  He states he will also increase his yoga and exercise to see if this helps with his acid reflux.  He will follow-up after making these diet lifestyle changes if symptoms persist or worsen.    If no improvement after diet lifestyle changes I would recommend an EGD to monitor for acid reflux and also investigate for H. pylori.    If anxiety seems to be an ongoing factor would revisit restarting anxiety/depression medication.    We will follow-up after heart monitor results.    Orders Placed This Encounter   Procedures     RESPIRATORY THERAPY REFERRAL     Cardiac Mobile Telemetry Monitor       HERIBERTO Ramos  [facility] AND [facility]    This document was prepared using voice generated software.  While every attempt was made for accuracy, grammatical errors may exist.

## 2020-09-14 NOTE — NURSING NOTE
"Chief Complaint   Patient presents with     Breathing Problem   Patient says he has some heart beats that are stronger than others. He has mid/upper back pain and has questions about his diaphragm and esophagus .    Initial /80 (BP Location: Right arm, Patient Position: Sitting, Cuff Size: Adult Large)   Pulse 51   Temp 97.8  F (36.6  C) (Tympanic)   Resp 18   Ht (P) 1.905 m (6' 3\")   Wt 86.6 kg (191 lb)   SpO2 94%   BMI (P) 23.87 kg/m   Estimated body mass index is 23.87 kg/m  (pended) as calculated from the following:    Height as of this encounter: (P) 1.905 m (6' 3\").    Weight as of this encounter: 86.6 kg (191 lb).  Medication Reconciliation: complete    Joleen Herbert LPN  "

## 2020-09-15 ASSESSMENT — ANXIETY QUESTIONNAIRES: GAD7 TOTAL SCORE: 8

## 2020-09-17 ENCOUNTER — HOSPITAL ENCOUNTER (OUTPATIENT)
Dept: CARDIOLOGY | Facility: OTHER | Age: 30
Discharge: HOME | End: 2020-09-17
Attending: PHYSICIAN ASSISTANT | Admitting: PHYSICIAN ASSISTANT
Payer: COMMERCIAL

## 2020-09-17 DIAGNOSIS — R00.2 PALPITATIONS: Primary | ICD-10-CM

## 2020-09-17 PROCEDURE — 93227 XTRNL ECG REC<48 HR R&I: CPT | Performed by: INTERNAL MEDICINE

## 2020-09-17 PROCEDURE — 93225 XTRNL ECG REC<48 HRS REC: CPT

## 2020-10-22 ENCOUNTER — OFFICE VISIT (OUTPATIENT)
Dept: FAMILY MEDICINE | Facility: OTHER | Age: 30
End: 2020-10-22
Attending: FAMILY MEDICINE
Payer: COMMERCIAL

## 2020-10-22 VITALS
OXYGEN SATURATION: 98 % | WEIGHT: 187 LBS | HEART RATE: 64 BPM | DIASTOLIC BLOOD PRESSURE: 64 MMHG | SYSTOLIC BLOOD PRESSURE: 94 MMHG | TEMPERATURE: 98 F | BODY MASS INDEX: 23.37 KG/M2 | RESPIRATION RATE: 16 BRPM

## 2020-10-22 DIAGNOSIS — R14.0 BLOATING SYMPTOM: Primary | ICD-10-CM

## 2020-10-22 LAB
ALBUMIN SERPL-MCNC: 5.1 G/DL (ref 3.5–5.7)
ALP SERPL-CCNC: 62 U/L (ref 34–104)
ALT SERPL W P-5'-P-CCNC: 20 U/L (ref 7–52)
ANION GAP SERPL CALCULATED.3IONS-SCNC: 7 MMOL/L (ref 3–14)
AST SERPL W P-5'-P-CCNC: 19 U/L (ref 13–39)
BASOPHILS # BLD AUTO: 0 10E9/L (ref 0–0.2)
BASOPHILS NFR BLD AUTO: 0.6 %
BILIRUB SERPL-MCNC: 0.7 MG/DL (ref 0.3–1)
BUN SERPL-MCNC: 20 MG/DL (ref 7–25)
CALCIUM SERPL-MCNC: 9.9 MG/DL (ref 8.6–10.3)
CHLORIDE SERPL-SCNC: 100 MMOL/L (ref 98–107)
CO2 SERPL-SCNC: 31 MMOL/L (ref 21–31)
CREAT SERPL-MCNC: 1.02 MG/DL (ref 0.7–1.3)
DIFFERENTIAL METHOD BLD: NORMAL
EOSINOPHIL # BLD AUTO: 0.1 10E9/L (ref 0–0.7)
EOSINOPHIL NFR BLD AUTO: 1.1 %
ERYTHROCYTE [DISTWIDTH] IN BLOOD BY AUTOMATED COUNT: 12.3 % (ref 10–15)
GFR SERPL CREATININE-BSD FRML MDRD: 85 ML/MIN/{1.73_M2}
GLUCOSE SERPL-MCNC: 101 MG/DL (ref 70–105)
HCT VFR BLD AUTO: 45.8 % (ref 40–53)
HGB BLD-MCNC: 16 G/DL (ref 13.3–17.7)
IMM GRANULOCYTES # BLD: 0 10E9/L (ref 0–0.4)
IMM GRANULOCYTES NFR BLD: 0.2 %
LIPASE SERPL-CCNC: 33 U/L (ref 11–82)
LYMPHOCYTES # BLD AUTO: 2 10E9/L (ref 0.8–5.3)
LYMPHOCYTES NFR BLD AUTO: 30.7 %
MCH RBC QN AUTO: 31.7 PG (ref 26.5–33)
MCHC RBC AUTO-ENTMCNC: 34.9 G/DL (ref 31.5–36.5)
MCV RBC AUTO: 91 FL (ref 78–100)
MONOCYTES # BLD AUTO: 0.4 10E9/L (ref 0–1.3)
MONOCYTES NFR BLD AUTO: 5.6 %
NEUTROPHILS # BLD AUTO: 4.1 10E9/L (ref 1.6–8.3)
NEUTROPHILS NFR BLD AUTO: 61.8 %
PLATELET # BLD AUTO: 240 10E9/L (ref 150–450)
POTASSIUM SERPL-SCNC: 4.5 MMOL/L (ref 3.5–5.1)
PROT SERPL-MCNC: 7.7 G/DL (ref 6.4–8.9)
RBC # BLD AUTO: 5.04 10E12/L (ref 4.4–5.9)
SODIUM SERPL-SCNC: 138 MMOL/L (ref 134–144)
WBC # BLD AUTO: 6.6 10E9/L (ref 4–11)

## 2020-10-22 PROCEDURE — 36415 COLL VENOUS BLD VENIPUNCTURE: CPT | Mod: ZL | Performed by: FAMILY MEDICINE

## 2020-10-22 PROCEDURE — 99214 OFFICE O/P EST MOD 30 MIN: CPT | Performed by: FAMILY MEDICINE

## 2020-10-22 PROCEDURE — 83690 ASSAY OF LIPASE: CPT | Mod: ZL | Performed by: FAMILY MEDICINE

## 2020-10-22 PROCEDURE — 80053 COMPREHEN METABOLIC PANEL: CPT | Mod: ZL | Performed by: FAMILY MEDICINE

## 2020-10-22 PROCEDURE — 85025 COMPLETE CBC W/AUTO DIFF WBC: CPT | Mod: ZL | Performed by: FAMILY MEDICINE

## 2020-10-22 ASSESSMENT — PAIN SCALES - GENERAL: PAINLEVEL: NO PAIN (0)

## 2020-10-22 NOTE — NURSING NOTE
Patient here for possible hernia he has multiple symptoms dull ache, abdominal discomfort, gas, pressure and bloating no relief form antacids, he has had 2 Holter monitors. Medication Reconciliation: complete.    Ciara Wilkes LPN  10/22/2020 4:09 PM

## 2020-10-24 ASSESSMENT — ENCOUNTER SYMPTOMS
NAUSEA: 0
HEARTBURN: 1
VOMITING: 0
CHOKING: 0
DIZZINESS: 0
ANAL BLEEDING: 0
UNEXPECTED WEIGHT CHANGE: 0
FEVER: 1
ABDOMINAL DISTENTION: 1
APPETITE CHANGE: 0
DIARRHEA: 0
ABDOMINAL PAIN: 1
HEMATOCHEZIA: 0
FATIGUE: 0
ACTIVITY CHANGE: 0
CHEST TIGHTNESS: 0
DIFFICULTY URINATING: 0

## 2020-10-24 NOTE — PROGRESS NOTES
"  SUBJECTIVE:   Kb Parnell is a 30 year old male who presents to clinic today for the following health issues: Hiatal hernia symptoms    Patient arrives here for hiatal hernia symptoms.  He is complaining of abdominal pain discomfort dull ache.  He reports gas.  Bloating.  Pressure.  And night sweats.  He states he has been on the Internet and is concerned about a hiatal hernia.  He has had times in the past with some minimal relief.  Symptoms have been going on for 2 years.  He also has a history of palpitations and has been on a Holter monitor x2 without any clear reason for his palpitations.  He states that he has been to a chiropractor who has \"pulled his hernia down\".  He had not noticed any improvement.  Usually symptoms are worse after eating.  He gets gas through the night pressure.  At times symptoms will go away with laying in bed.        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 No pertinent past medical history.   Past Surgical History:   Procedure Laterality Date     OTHER SURGICAL HISTORY      2002,600000,OTHER,involving closed reduction       Review of Systems   Constitutional: Positive for fever. Negative for activity change, appetite change, fatigue and unexpected weight change.   Respiratory: Negative for choking and chest tightness.    Gastrointestinal: Positive for abdominal distention, abdominal pain and heartburn. Negative for anal bleeding, diarrhea, hematochezia, nausea and vomiting.   Genitourinary: Negative for difficulty urinating.   Neurological: Negative for dizziness.        OBJECTIVE:     BP 94/64   Pulse 64   Temp 98  F (36.7  C)   Resp 16   Wt 84.8 kg (187 lb)   SpO2 98%   BMI (P) 23.37 kg/m    Body mass index is 23.37 kg/m  (pended).  Physical Exam  Constitutional:       Appearance: Normal appearance.   HENT:      Head: Normocephalic. "   Cardiovascular:      Rate and Rhythm: Normal rate and regular rhythm.   Pulmonary:      Effort: Pulmonary effort is normal.      Breath sounds: Normal breath sounds.   Abdominal:      General: Abdomen is flat. There is no distension.      Palpations: There is no mass.      Tenderness: There is no abdominal tenderness.      Hernia: No hernia is present.   Neurological:      Mental Status: He is alert.         Diagnostic Test Results:  Results for orders placed or performed in visit on 10/22/20   Lipase     Status: None   Result Value Ref Range    Lipase 33 11 - 82 U/L   Comprehensive Metabolic Panel     Status: None   Result Value Ref Range    Sodium 138 134 - 144 mmol/L    Potassium 4.5 3.5 - 5.1 mmol/L    Chloride 100 98 - 107 mmol/L    Carbon Dioxide 31 21 - 31 mmol/L    Anion Gap 7 3 - 14 mmol/L    Glucose 101 70 - 105 mg/dL    Urea Nitrogen 20 7 - 25 mg/dL    Creatinine 1.02 0.70 - 1.30 mg/dL    GFR Estimate 85 >60 mL/min/[1.73_m2]    GFR Estimate If Black >90 >60 mL/min/[1.73_m2]    Calcium 9.9 8.6 - 10.3 mg/dL    Bilirubin Total 0.7 0.3 - 1.0 mg/dL    Albumin 5.1 3.5 - 5.7 g/dL    Protein Total 7.7 6.4 - 8.9 g/dL    Alkaline Phosphatase 62 34 - 104 U/L    ALT 20 7 - 52 U/L    AST 19 13 - 39 U/L   CBC and Differential     Status: None   Result Value Ref Range    WBC 6.6 4.0 - 11.0 10e9/L    RBC Count 5.04 4.4 - 5.9 10e12/L    Hemoglobin 16.0 13.3 - 17.7 g/dL    Hematocrit 45.8 40.0 - 53.0 %    MCV 91 78 - 100 fl    MCH 31.7 26.5 - 33.0 pg    MCHC 34.9 31.5 - 36.5 g/dL    RDW 12.3 10.0 - 15.0 %    Platelet Count 240 150 - 450 10e9/L    Diff Method Automated Method     % Neutrophils 61.8 %    % Lymphocytes 30.7 %    % Monocytes 5.6 %    % Eosinophils 1.1 %    % Basophils 0.6 %    % Immature Granulocytes 0.2 %    Absolute Neutrophil 4.1 1.6 - 8.3 10e9/L    Absolute Lymphocytes 2.0 0.8 - 5.3 10e9/L    Absolute Monocytes 0.4 0.0 - 1.3 10e9/L    Absolute Eosinophils 0.1 0.0 - 0.7 10e9/L    Absolute Basophils 0.0  0.0 - 0.2 10e9/L    Abs Immature Granulocytes 0.0 0 - 0.4 10e9/L     I had a long discussion with the patient that he certainly could have a hiatal hernia but he did have a chest x-ray recently showing no evidence of hiatal hernia.  I discussed that if he does not see very small.  And would recommend over-the-counter Prilosec initially.  Not feel he is in need or a candidate for scoping him.  Especially with labs being unremarkable.  Reassurance is given.  Follow-up if any changes should occur.           1. Bloating symptom    - CBC and Differential; Future  - Comprehensive Metabolic Panel; Future  - Lipase; Future  - Lipase  - Comprehensive Metabolic Panel  - CBC and Differential      Obi Fuentes MD  [facility] AND [facility]

## 2021-01-03 ENCOUNTER — HEALTH MAINTENANCE LETTER (OUTPATIENT)
Age: 31
End: 2021-01-03

## 2021-04-12 ENCOUNTER — NURSE TRIAGE (OUTPATIENT)
Dept: FAMILY MEDICINE | Facility: OTHER | Age: 31
End: 2021-04-12

## 2021-04-12 NOTE — TELEPHONE ENCOUNTER
"S-(situation): Pt removed 2 ticks Saturday and c/o swelling and itching.     B-(background): Last tetanus booster 5/19/2009.    A-(assessment): Tick #1: deer tick,  in \"butt crack\", likely attached 4/6, removed 4/11, Tick #2: deer tick, behind left knee, likely attached AND removed 4/11. Both sites swollen and itchy. Both ticks removed in entirety and alive after removal- kept in plastic baggy. Denies: fever, headache, joint discomfort, red rash or ring    R-(recommendations): Protocol recommends Pt be seen today in office, due to probable deer tick possibly attached >36 hours, to consider antibiotic prophylaxis. Pt requesting prescription without being seen.     Eli Roche RN .............. 4/12/2021  9:23 AM      ________________________________________________________    Additional Information    Negative: Not a tick bite    Negative: Patient sounds very sick or weak to the triager    Negative: Fever or severe headache occurs, 2 to 14 days following the bite    Negative: Widespread rash occurs, 2 to 14 days following the bite    Negative: Can't remove live tick (after using Care Advice)    Negative: Can't remove tick's head that was broken off in the skin (after using Care Advice)    Negative: Fever and area is red    Negative: Fever and area is very tender to touch    Negative: Red streak or red line and length > 2 inches (5 cm)    Negative: Red ring or bull's-eye rash occurs around a deer tick bite    Probable deer tick that was attached > 36 hours (or tick appears swollen, not flat)    Negative: Patient wants to be seen    Protocols used: TICK BITE-A-OH      "

## 2021-04-12 NOTE — TELEPHONE ENCOUNTER
Patient returned call and asked if he decides to be seen and is prescribed doxycycline, would this affect him getting his 2nd COVID vaccine on Thursday, as this is his priority. Routing to Dr. Fuentes, to advise. Eli Roche RN .............. 4/12/2021  11:09 AM

## 2021-04-12 NOTE — TELEPHONE ENCOUNTER
Called and spoke to Patient after verifying last name and date of birth. Pt notified of Dr. Fuentes's response/recommendation to be seen today in office. After options and risks involved were discussed and Pt's questions were answered, Pt verbalized plan to monitor for additional symptoms, and schedule appointment or present to [facility] as needed. Eli Roche RN .............. 4/12/2021  11:04 AM

## 2021-04-25 ENCOUNTER — HEALTH MAINTENANCE LETTER (OUTPATIENT)
Age: 31
End: 2021-04-25

## 2021-10-09 ENCOUNTER — HEALTH MAINTENANCE LETTER (OUTPATIENT)
Age: 31
End: 2021-10-09

## 2022-04-14 ENCOUNTER — OFFICE VISIT (OUTPATIENT)
Dept: FAMILY MEDICINE | Facility: OTHER | Age: 32
End: 2022-04-14
Attending: PEDIATRICS
Payer: COMMERCIAL

## 2022-04-14 VITALS
HEART RATE: 62 BPM | SYSTOLIC BLOOD PRESSURE: 130 MMHG | BODY MASS INDEX: 23 KG/M2 | DIASTOLIC BLOOD PRESSURE: 72 MMHG | TEMPERATURE: 97.8 F | OXYGEN SATURATION: 99 % | WEIGHT: 184 LBS | RESPIRATION RATE: 16 BRPM

## 2022-04-14 DIAGNOSIS — Z11.3 SCREEN FOR STD (SEXUALLY TRANSMITTED DISEASE): Primary | ICD-10-CM

## 2022-04-14 LAB
C TRACH DNA SPEC QL PROBE+SIG AMP: NEGATIVE
N GONORRHOEA DNA SPEC QL NAA+PROBE: NEGATIVE

## 2022-04-14 PROCEDURE — 87491 CHLMYD TRACH DNA AMP PROBE: CPT | Mod: ZL | Performed by: FAMILY MEDICINE

## 2022-04-14 PROCEDURE — 99213 OFFICE O/P EST LOW 20 MIN: CPT | Performed by: FAMILY MEDICINE

## 2022-04-14 ASSESSMENT — PAIN SCALES - GENERAL: PAINLEVEL: NO PAIN (0)

## 2022-04-14 NOTE — NURSING NOTE
Patient here for STD check, no symptoms just would like to be tested. Medication Reconciliation: complete.    Ciara Wilkes LPN  4/14/2022 10:51 AM

## 2022-04-14 NOTE — PROGRESS NOTES
Assessment & Plan     Screen for STD (sexually transmitted disease)  Results for orders placed or performed in visit on 04/14/22   GC/Chlamydia by PCR     Status: Normal    Specimen: Urine, Voided   Result Value Ref Range    Chlamydia Trachomatis Negative Negative    Neisseria gonorrhoeae Negative Negative    Narrative    Assay performed using [employer] real-time, reverse-transcriptase PCR.   Testing negative.  Continue surveillance as needed    - GC/Chlamydia by PCR; Future  - GC/Chlamydia by PCR               No follow-ups on file.    Obi Fuentes MD  [facility] AND [facility]   Kb is a 31 year old who presents for the following health issues STD check     Patient arrives here for STD check.  He reports he just wants to be screened.  He has no symptoms.  No known exposures.  Reports no lesions on his penis.    STD    History of Present Illness       Reason for visit:  STI/STD Screening  Symptoms include:  None    He eats 2-3 servings of fruits and vegetables daily.He consumes 0 sweetened beverage(s) daily.He exercises with enough effort to increase his heart rate 30 to 60 minutes per day.  He exercises with enough effort to increase his heart rate 4 days per week.   He is taking medications regularly.             Review of Systems         Objective    /72   Pulse 62   Temp 97.8  F (36.6  C)   Resp 16   Wt 83.5 kg (184 lb)   SpO2 99%   BMI (P) 23.00 kg/m    Body mass index is 23 kg/m  (pended).  Physical Exam  Constitutional:       Appearance: Normal appearance.   Neurological:      Mental Status: He is alert.   Psychiatric:         Mood and Affect: Mood normal.         Thought Content: Thought content normal.

## 2023-06-23 ENCOUNTER — OFFICE VISIT (OUTPATIENT)
Dept: FAMILY MEDICINE | Facility: OTHER | Age: 33
End: 2023-06-23
Attending: NURSE PRACTITIONER
Payer: COMMERCIAL

## 2023-06-23 VITALS
RESPIRATION RATE: 16 BRPM | HEIGHT: 75 IN | OXYGEN SATURATION: 98 % | BODY MASS INDEX: 23.95 KG/M2 | WEIGHT: 192.6 LBS | TEMPERATURE: 97.9 F | HEART RATE: 64 BPM | SYSTOLIC BLOOD PRESSURE: 130 MMHG | DIASTOLIC BLOOD PRESSURE: 80 MMHG

## 2023-06-23 DIAGNOSIS — Z82.49 FH: PREMATURE CORONARY HEART DISEASE: ICD-10-CM

## 2023-06-23 DIAGNOSIS — Z00.00 ROUTINE GENERAL MEDICAL EXAMINATION AT A HEALTH CARE FACILITY: Primary | ICD-10-CM

## 2023-06-23 DIAGNOSIS — E78.2 ELEVATED CHOLESTEROL WITH ELEVATED TRIGLYCERIDES: ICD-10-CM

## 2023-06-23 DIAGNOSIS — Z11.3 SCREEN FOR STD (SEXUALLY TRANSMITTED DISEASE): ICD-10-CM

## 2023-06-23 LAB
ALBUMIN SERPL BCG-MCNC: 5.1 G/DL (ref 3.5–5.2)
ALP SERPL-CCNC: 81 U/L (ref 40–129)
ALT SERPL W P-5'-P-CCNC: 19 U/L (ref 0–70)
ANION GAP SERPL CALCULATED.3IONS-SCNC: 13 MMOL/L (ref 7–15)
AST SERPL W P-5'-P-CCNC: 23 U/L (ref 0–45)
BASOPHILS # BLD AUTO: 0 10E3/UL (ref 0–0.2)
BASOPHILS NFR BLD AUTO: 1 %
BILIRUB SERPL-MCNC: 0.5 MG/DL
BUN SERPL-MCNC: 16.7 MG/DL (ref 6–20)
C TRACH DNA SPEC QL PROBE+SIG AMP: NEGATIVE
CALCIUM SERPL-MCNC: 9.2 MG/DL (ref 8.6–10)
CHLORIDE SERPL-SCNC: 103 MMOL/L (ref 98–107)
CHOLEST SERPL-MCNC: 253 MG/DL
CREAT SERPL-MCNC: 1.01 MG/DL (ref 0.67–1.17)
DEPRECATED HCO3 PLAS-SCNC: 25 MMOL/L (ref 22–29)
EOSINOPHIL # BLD AUTO: 0.1 10E3/UL (ref 0–0.7)
EOSINOPHIL NFR BLD AUTO: 1 %
ERYTHROCYTE [DISTWIDTH] IN BLOOD BY AUTOMATED COUNT: 12.4 % (ref 10–15)
GFR SERPL CREATININE-BSD FRML MDRD: >90 ML/MIN/1.73M2
GLUCOSE SERPL-MCNC: 97 MG/DL (ref 70–99)
HCT VFR BLD AUTO: 47.1 % (ref 40–53)
HDLC SERPL-MCNC: 56 MG/DL
HGB BLD-MCNC: 16.8 G/DL (ref 13.3–17.7)
IMM GRANULOCYTES # BLD: 0 10E3/UL
IMM GRANULOCYTES NFR BLD: 0 %
LDLC SERPL CALC-MCNC: 151 MG/DL
LYMPHOCYTES # BLD AUTO: 1.7 10E3/UL (ref 0.8–5.3)
LYMPHOCYTES NFR BLD AUTO: 30 %
MCH RBC QN AUTO: 32.8 PG (ref 26.5–33)
MCHC RBC AUTO-ENTMCNC: 35.7 G/DL (ref 31.5–36.5)
MCV RBC AUTO: 92 FL (ref 78–100)
MONOCYTES # BLD AUTO: 0.3 10E3/UL (ref 0–1.3)
MONOCYTES NFR BLD AUTO: 6 %
N GONORRHOEA DNA SPEC QL NAA+PROBE: NEGATIVE
NEUTROPHILS # BLD AUTO: 3.6 10E3/UL (ref 1.6–8.3)
NEUTROPHILS NFR BLD AUTO: 62 %
NONHDLC SERPL-MCNC: 197 MG/DL
NRBC # BLD AUTO: 0 10E3/UL
NRBC BLD AUTO-RTO: 0 /100
PLATELET # BLD AUTO: 228 10E3/UL (ref 150–450)
POTASSIUM SERPL-SCNC: 4.3 MMOL/L (ref 3.4–5.3)
PROT SERPL-MCNC: 7.9 G/DL (ref 6.4–8.3)
RBC # BLD AUTO: 5.12 10E6/UL (ref 4.4–5.9)
SODIUM SERPL-SCNC: 141 MMOL/L (ref 136–145)
TRIGL SERPL-MCNC: 230 MG/DL
WBC # BLD AUTO: 5.7 10E3/UL (ref 4–11)

## 2023-06-23 PROCEDURE — 87591 N.GONORRHOEAE DNA AMP PROB: CPT | Mod: ZL | Performed by: NURSE PRACTITIONER

## 2023-06-23 PROCEDURE — 90471 IMMUNIZATION ADMIN: CPT | Performed by: NURSE PRACTITIONER

## 2023-06-23 PROCEDURE — 90715 TDAP VACCINE 7 YRS/> IM: CPT | Performed by: NURSE PRACTITIONER

## 2023-06-23 PROCEDURE — 85025 COMPLETE CBC W/AUTO DIFF WBC: CPT | Mod: ZL | Performed by: NURSE PRACTITIONER

## 2023-06-23 PROCEDURE — 80061 LIPID PANEL: CPT | Mod: ZL | Performed by: NURSE PRACTITIONER

## 2023-06-23 PROCEDURE — 36415 COLL VENOUS BLD VENIPUNCTURE: CPT | Mod: ZL | Performed by: NURSE PRACTITIONER

## 2023-06-23 PROCEDURE — 86803 HEPATITIS C AB TEST: CPT | Mod: ZL | Performed by: NURSE PRACTITIONER

## 2023-06-23 PROCEDURE — 86780 TREPONEMA PALLIDUM: CPT | Mod: ZL | Performed by: NURSE PRACTITIONER

## 2023-06-23 PROCEDURE — 87389 HIV-1 AG W/HIV-1&-2 AB AG IA: CPT | Mod: ZL | Performed by: NURSE PRACTITIONER

## 2023-06-23 PROCEDURE — 99395 PREV VISIT EST AGE 18-39: CPT | Mod: 25 | Performed by: NURSE PRACTITIONER

## 2023-06-23 PROCEDURE — 80053 COMPREHEN METABOLIC PANEL: CPT | Mod: ZL | Performed by: NURSE PRACTITIONER

## 2023-06-23 PROCEDURE — 87491 CHLMYD TRACH DNA AMP PROBE: CPT | Mod: ZL | Performed by: NURSE PRACTITIONER

## 2023-06-23 ASSESSMENT — ANXIETY QUESTIONNAIRES
6. BECOMING EASILY ANNOYED OR IRRITABLE: SEVERAL DAYS
5. BEING SO RESTLESS THAT IT IS HARD TO SIT STILL: NOT AT ALL
GAD7 TOTAL SCORE: 7
4. TROUBLE RELAXING: SEVERAL DAYS
1. FEELING NERVOUS, ANXIOUS, OR ON EDGE: NEARLY EVERY DAY
IF YOU CHECKED OFF ANY PROBLEMS ON THIS QUESTIONNAIRE, HOW DIFFICULT HAVE THESE PROBLEMS MADE IT FOR YOU TO DO YOUR WORK, TAKE CARE OF THINGS AT HOME, OR GET ALONG WITH OTHER PEOPLE: SOMEWHAT DIFFICULT
7. FEELING AFRAID AS IF SOMETHING AWFUL MIGHT HAPPEN: SEVERAL DAYS
2. NOT BEING ABLE TO STOP OR CONTROL WORRYING: NOT AT ALL
GAD7 TOTAL SCORE: 7
7. FEELING AFRAID AS IF SOMETHING AWFUL MIGHT HAPPEN: SEVERAL DAYS
8. IF YOU CHECKED OFF ANY PROBLEMS, HOW DIFFICULT HAVE THESE MADE IT FOR YOU TO DO YOUR WORK, TAKE CARE OF THINGS AT HOME, OR GET ALONG WITH OTHER PEOPLE?: SOMEWHAT DIFFICULT
GAD7 TOTAL SCORE: 7
3. WORRYING TOO MUCH ABOUT DIFFERENT THINGS: SEVERAL DAYS

## 2023-06-23 ASSESSMENT — PAIN SCALES - GENERAL: PAINLEVEL: NO PAIN (0)

## 2023-06-23 NOTE — PROGRESS NOTES
SUBJECTIVE:   CC: Kb is an 32 year old who presents for preventative health visit.       Healthy Habits:     Getting at least 3 servings of Calcium per day:  NO    Bi-annual eye exam:  NO    Dental care twice a year:  Yes    Sleep apnea or symptoms of sleep apnea:  None    Diet:  Regular (no restrictions)    Frequency of exercise:  1 day/week    Duration of exercise:  30-45 minutes    Taking medications regularly:  Yes    Medication side effects:  Not applicable    PHQ-2 Total Score: 0    Additional concerns today:  Yes    He comes in today for annual physical.  He reports strong family history of early heart disease, his dad and grandfather both passed in their 40s and 50s due to sudden cardiac disease.  He works really hard at being physically active, eating healthy but sometimes his work situation does not allow him to eat healthy on a regular basis.  He does report he is under quite a bit of stress through work, does have anxiety.    He would like to have STD screening.  He reports 5 female sexual partners since his last screening.  He does not use condoms.  Denies any current symptoms at this        Today's PHQ-2 Score:       6/23/2023     3:12 PM   PHQ-2 ( 1999 [employer])   Q1: Little interest or pleasure in doing things 0   Q2: Feeling down, depressed or hopeless 0   PHQ-2 Score 0   Q1: Little interest or pleasure in doing things Not at all   Q2: Feeling down, depressed or hopeless Not at all   PHQ-2 Score 0       Have you ever done Advance Care Planning? (For example, a Health Directive, POLST, or a discussion with a medical provider or your loved ones about your wishes):     Social History     Tobacco Use     Smoking status: Never     Smokeless tobacco: Former   Substance Use Topics     Alcohol use: Yes     Comment: 3 a day              6/23/2023     3:12 PM   Alcohol Use   Prescreen: >3 drinks/day or >7 drinks/week? Yes   AUDIT SCORE  10       Last PSA: No results found for: PSA    Reviewed orders with  patient. Reviewed health maintenance and updated orders accordingly - Yes  BP Readings from Last 3 Encounters:   06/23/23 130/80   04/14/22 130/72   10/22/20 94/64    Wt Readings from Last 3 Encounters:   06/23/23 87.4 kg (192 lb 9.6 oz)   04/14/22 83.5 kg (184 lb)   10/22/20 84.8 kg (187 lb)                  Patient Active Problem List   Diagnosis     Trichotillomania     Anxiety     Past Surgical History:   Procedure Laterality Date     OTHER SURGICAL HISTORY      2002,600000,OTHER,involving closed reduction       Social History     Tobacco Use     Smoking status: Never     Smokeless tobacco: Former   Substance Use Topics     Alcohol use: Yes     Comment: 3 a day      Family History   Problem Relation Age of Onset     Heart Disease Father 46        Heart Disease,MI     Hypertension Mother         Hypertension     Other - See Comments Mother         Psychiatric illness,Anxiety     Depression Brother      Alcoholism Brother      Myocardial Infarction Paternal Grandfather      Diabetes No family hx of         Diabetes     Cancer No family hx of         Cancer         No current outpatient medications on file.     Allergies   Allergen Reactions     Penicillins Anxiety     Nickel      metal       Reviewed and updated as needed this visit by clinical staff   Tobacco  Allergies  Meds      Soc Hx        Reviewed and updated as needed this visit by Provider                 History reviewed. No pertinent past medical history.   Past Surgical History:   Procedure Laterality Date     OTHER SURGICAL HISTORY      2002,600000,OTHER,involving closed reduction       Review of Systems  CONSTITUTIONAL: NEGATIVE for fever, chills, change in weight  INTEGUMENTARY/SKIN: back lesion for several years  EYES: NEGATIVE for vision changes or irritation, no recent eye appointment  ENT: NEGATIVE for ear, mouth and throat problems  RESP: NEGATIVE for significant cough or SOB  CV: occasional left side chest pain  GI: occasional llq pain    "male: negative for dysuria, hematuria, decreased urinary stream, erectile dysfunction, urethral discharge  MUSCULOSKELETAL: NEGATIVE for significant arthralgias or myalgia  NEURO: NEGATIVE for weakness, dizziness or paresthesias  PSYCHIATRIC: NEGATIVE for changes in mood or affect    OBJECTIVE:   /80   Pulse 64   Temp 97.9  F (36.6  C)   Resp 16   Ht 1.905 m (6' 3\")   Wt 87.4 kg (192 lb 9.6 oz)   SpO2 98%   BMI 24.07 kg/m      Physical Exam  GENERAL: healthy, alert and no distress  EYES: Eyes grossly normal to inspection, PERRL and conjunctivae and sclerae normal  HENT: ear canals and TM's normal, nose and mouth without ulcers or lesions  NECK: no adenopathy, no asymmetry, masses, or scars and thyroid normal to palpation  RESP: lungs clear to auscultation - no rales, rhonchi or wheezes  CV: regular rate and rhythm, normal S1 S2, no S3 or S4, no murmur, click or rub, no peripheral edema and peripheral pulses strong  ABDOMEN: soft, nontender, no hepatosplenomegaly, no masses and bowel sounds normal  MS: no gross musculoskeletal defects noted, no edema  SKIN: no suspicious lesions or rashes  NEURO: Normal strength and tone, mentation intact and speech normal  PSYCH: mentation appears normal, affect normal/bright    Diagnostic Test Results:  Labs reviewed in Epic  Results for orders placed or performed in visit on 06/23/23   Comprehensive Metabolic Panel     Status: Normal   Result Value Ref Range    Sodium 141 136 - 145 mmol/L    Potassium 4.3 3.4 - 5.3 mmol/L    Chloride 103 98 - 107 mmol/L    Carbon Dioxide (CO2) 25 22 - 29 mmol/L    Anion Gap 13 7 - 15 mmol/L    Urea Nitrogen 16.7 6.0 - 20.0 mg/dL    Creatinine 1.01 0.67 - 1.17 mg/dL    Calcium 9.2 8.6 - 10.0 mg/dL    Glucose 97 70 - 99 mg/dL    Alkaline Phosphatase 81 40 - 129 U/L    AST 23 0 - 45 U/L    ALT 19 0 - 70 U/L    Protein Total 7.9 6.4 - 8.3 g/dL    Albumin 5.1 3.5 - 5.2 g/dL    Bilirubin Total 0.5 <=1.2 mg/dL    GFR Estimate >90 >60 " mL/min/1.73m2   Lipid Panel     Status: Abnormal   Result Value Ref Range    Cholesterol 253 (H) <200 mg/dL    Triglycerides 230 (H) <150 mg/dL    Direct Measure HDL 56 >=40 mg/dL    LDL Cholesterol Calculated 151 (H) <=100 mg/dL    Non HDL Cholesterol 197 (H) <130 mg/dL    Narrative    Cholesterol  Desirable:  <200 mg/dL    Triglycerides  Normal:  Less than 150 mg/dL  Borderline High:  150-199 mg/dL  High:  200-499 mg/dL  Very High:  Greater than or equal to 500 mg/dL    Direct Measure HDL  Female:  Greater than or equal to 50 mg/dL   Male:  Greater than or equal to 40 mg/dL    LDL Cholesterol  Desirable:  <100mg/dL  Above Desirable:  100-129 mg/dL   Borderline High:  130-159 mg/dL   High:  160-189 mg/dL   Very High:  >= 190 mg/dL    Non HDL Cholesterol  Desirable:  130 mg/dL  Above Desirable:  130-159 mg/dL  Borderline High:  160-189 mg/dL  High:  190-219 mg/dL  Very High:  Greater than or equal to 220 mg/dL   Treponema Ab w Reflex to RPR and Titer     Status: Normal   Result Value Ref Range    Treponema Antibody Total Nonreactive Nonreactive   CBC with platelets and differential     Status: None   Result Value Ref Range    WBC Count 5.7 4.0 - 11.0 10e3/uL    RBC Count 5.12 4.40 - 5.90 10e6/uL    Hemoglobin 16.8 13.3 - 17.7 g/dL    Hematocrit 47.1 40.0 - 53.0 %    MCV 92 78 - 100 fL    MCH 32.8 26.5 - 33.0 pg    MCHC 35.7 31.5 - 36.5 g/dL    RDW 12.4 10.0 - 15.0 %    Platelet Count 228 150 - 450 10e3/uL    % Neutrophils 62 %    % Lymphocytes 30 %    % Monocytes 6 %    % Eosinophils 1 %    % Basophils 1 %    % Immature Granulocytes 0 %    NRBCs per 100 WBC 0 <1 /100    Absolute Neutrophils 3.6 1.6 - 8.3 10e3/uL    Absolute Lymphocytes 1.7 0.8 - 5.3 10e3/uL    Absolute Monocytes 0.3 0.0 - 1.3 10e3/uL    Absolute Eosinophils 0.1 0.0 - 0.7 10e3/uL    Absolute Basophils 0.0 0.0 - 0.2 10e3/uL    Absolute Immature Granulocytes 0.0 <=0.4 10e3/uL    Absolute NRBCs 0.0 10e3/uL   GC/Chlamydia by PCR     Status: Normal     Specimen: Urine, Voided   Result Value Ref Range    Chlamydia Trachomatis Negative Negative    Neisseria gonorrhoeae Negative Negative    Narrative    Assay performed using [employer] real-time, reverse-transcriptase PCR.   CBC and Differential     Status: None    Narrative    The following orders were created for panel order CBC and Differential.  Procedure                               Abnormality         Status                     ---------                               -----------         ------                     CBC with platelets and d...[[id number]]                      Final result                 Please view results for these tests on the individual orders.       ASSESSMENT/PLAN:       ICD-10-CM    1. Routine general medical examination at a health care facility  Z00.00 Lipid Panel     Comprehensive Metabolic Panel     CBC and Differential     CBC and Differential     Comprehensive Metabolic Panel     Lipid Panel      2. Screen for STD (sexually transmitted disease)  Z11.3 GC/Chlamydia by PCR     Hepatitis C Screen Reflex to HCV RNA Quant and Genotype     Treponema Ab w Reflex to RPR and Titer     HIV Antigen Antibody Combo     HIV Antigen Antibody Combo     Treponema Ab w Reflex to RPR and Titer     Hepatitis C Screen Reflex to HCV RNA Quant and Genotype     GC/Chlamydia by PCR      3. FH: premature coronary heart disease  Z82.49 Lipid Panel     Comprehensive Metabolic Panel     CBC and Differential     CBC and Differential     Comprehensive Metabolic Panel     Lipid Panel        STD screening completed, negative thus far.  Lipid panel, CMP, CBC obtained.  Lipid panel is elevated, remainder of labs are stable.  Given elevated cholesterol panel, strong family history, recommend referral over to internal medicine for further evaluation.    [place] updated today.    Patient has been advised of split billing requirements and indicates understanding: Yes      COUNSELING:   Reviewed preventive health counseling,  as reflected in patient instructions       Regular exercise       Healthy diet/nutrition       Vision screening       Safe sex practices/STD prevention       Consider Hep C screening for all patients one time for ages 18-79 years       Syphilis screening for high risk patients        HIV screeninx in teen years, 1x in adult years, and at intervals if high risk        He reports that he has never smoked. He has quit using smokeless tobacco.        JOVANNA Guillermo [facility]  Answers for HPI/ROS submitted by the patient on 2023  MAXWELL 7 TOTAL SCORE: 7

## 2023-06-25 ENCOUNTER — TELEPHONE (OUTPATIENT)
Dept: FAMILY MEDICINE | Facility: OTHER | Age: 33
End: 2023-06-25
Payer: COMMERCIAL

## 2023-06-25 LAB — T PALLIDUM AB SER QL: NONREACTIVE

## 2023-06-25 NOTE — TELEPHONE ENCOUNTER
Please call patient to ask what kind of questions he has. JOVANNA Guillermo CNP on 6/25/2023 at 4:07 PM

## 2023-06-26 LAB
HCV AB SERPL QL IA: NONREACTIVE
HIV 1+2 AB+HIV1 P24 AG SERPL QL IA: NONREACTIVE

## 2023-06-27 NOTE — TELEPHONE ENCOUNTER
HIV, HCV, syphilis and gc/chlamydia are all negative. JOVANNA Guillermo CNP on 6/27/2023 at 7:26 AM

## 2023-07-18 ENCOUNTER — OFFICE VISIT (OUTPATIENT)
Dept: INTERNAL MEDICINE | Facility: OTHER | Age: 33
End: 2023-07-18
Payer: COMMERCIAL

## 2023-07-18 VITALS
OXYGEN SATURATION: 95 % | BODY MASS INDEX: 24.05 KG/M2 | HEIGHT: 75 IN | HEART RATE: 68 BPM | SYSTOLIC BLOOD PRESSURE: 120 MMHG | DIASTOLIC BLOOD PRESSURE: 72 MMHG | TEMPERATURE: 98.1 F | WEIGHT: 193.38 LBS | RESPIRATION RATE: 20 BRPM

## 2023-07-18 DIAGNOSIS — Z82.41 FAMILY HISTORY OF SUDDEN CARDIAC DEATH IN FATHER: ICD-10-CM

## 2023-07-18 DIAGNOSIS — Z82.49 FH: PREMATURE CORONARY HEART DISEASE: ICD-10-CM

## 2023-07-18 DIAGNOSIS — Z82.49 FAMILY HISTORY OF EARLY CAD: Primary | ICD-10-CM

## 2023-07-18 DIAGNOSIS — E78.2 ELEVATED CHOLESTEROL WITH ELEVATED TRIGLYCERIDES: ICD-10-CM

## 2023-07-18 LAB
HBA1C MFR BLD: 4.7 % (ref 4–6.2)
HOLD SPECIMEN: NORMAL

## 2023-07-18 PROCEDURE — 36415 COLL VENOUS BLD VENIPUNCTURE: CPT | Mod: ZL

## 2023-07-18 PROCEDURE — 83036 HEMOGLOBIN GLYCOSYLATED A1C: CPT | Mod: ZL

## 2023-07-18 PROCEDURE — 99214 OFFICE O/P EST MOD 30 MIN: CPT

## 2023-07-18 ASSESSMENT — PAIN SCALES - GENERAL: PAINLEVEL: NO PAIN (0)

## 2023-07-18 ASSESSMENT — ANXIETY QUESTIONNAIRES
4. TROUBLE RELAXING: MORE THAN HALF THE DAYS
GAD7 TOTAL SCORE: 12
GAD7 TOTAL SCORE: 12
7. FEELING AFRAID AS IF SOMETHING AWFUL MIGHT HAPPEN: MORE THAN HALF THE DAYS
3. WORRYING TOO MUCH ABOUT DIFFERENT THINGS: MORE THAN HALF THE DAYS
2. NOT BEING ABLE TO STOP OR CONTROL WORRYING: MORE THAN HALF THE DAYS
1. FEELING NERVOUS, ANXIOUS, OR ON EDGE: MORE THAN HALF THE DAYS
6. BECOMING EASILY ANNOYED OR IRRITABLE: SEVERAL DAYS
5. BEING SO RESTLESS THAT IT IS HARD TO SIT STILL: SEVERAL DAYS
IF YOU CHECKED OFF ANY PROBLEMS ON THIS QUESTIONNAIRE, HOW DIFFICULT HAVE THESE PROBLEMS MADE IT FOR YOU TO DO YOUR WORK, TAKE CARE OF THINGS AT HOME, OR GET ALONG WITH OTHER PEOPLE: SOMEWHAT DIFFICULT

## 2023-07-18 ASSESSMENT — PATIENT HEALTH QUESTIONNAIRE - PHQ9
SUM OF ALL RESPONSES TO PHQ QUESTIONS 1-9: 4
SUM OF ALL RESPONSES TO PHQ QUESTIONS 1-9: 4
10. IF YOU CHECKED OFF ANY PROBLEMS, HOW DIFFICULT HAVE THESE PROBLEMS MADE IT FOR YOU TO DO YOUR WORK, TAKE CARE OF THINGS AT HOME, OR GET ALONG WITH OTHER PEOPLE: SOMEWHAT DIFFICULT

## 2023-07-18 NOTE — LETTER
Kb-please let me know if you would like me to order an echocardiogram to rule out possible genetic valvular disease.  Additionally we could proceed with a CT calcium score to ensure that she do not have excessive calcium buildup on your coronary arteries placing you at increased risk for myocardial infarction.  I am happy to place these orders if you decide to proceed with this testing.

## 2023-07-18 NOTE — PROGRESS NOTES
Assessment & Plan   Kb Parnell is a 33 year old presenting for the following health issues:      ICD-10-CM    1. Family history of early CAD  Z82.49       2. Family history of sudden cardiac death in father  Z82.41       3. FH: premature coronary heart disease  Z82.49 Internal Medicine Referral     Hemoglobin A1c     Hemoglobin A1c      4. Elevated cholesterol with elevated triglycerides  E78.2 Internal Medicine Referral     Lipid Panel          MIXED HYPERLIPIDEMIA.  Ongoing. LDL is at goal: No. Triglycerides are at goal: No.  Hopefully lifestyle modifications will improve cholesterol levels, otherwise we will need to consider additional medication dose adjustments or medication changes.     Strongly recommended starting patient on a statin due to elevated cholesterol levels and his family history.  Patient declines this and would like to try lifestyle modifications and recheck this in 6 months and will reconsider starting a statin at that time.  I did place a lipid order to be drawn in 6 months.  I offered an echocardiogram as we do not know whether patient's father had valvular disease or other condition leading up to his myocardial infarction.  Additionally patient was offered a CT calcium score.  Patient will think about this and will reach out if he would like to proceed with further testing to rule out abnormalities.  I will place these orders if patient would like to proceed.    Recent Labs   Lab Test 23  1557   CHOL 253*   HDL 56   *   TRIG 230*          Return in about 6 months (around 2024), or Recheck cholesterol levels- lab order has been placed.    JOVANNA Tinajero [facility] [place]      Marielle Quiles is a 33 year old, presenting for the following health issues:     Patient was referred to internal medicine to discuss patient's risk factor as his father  at age 46 from myocardial infarction.  Patient states that his father also was obese and  smoked at that time.  He mentions that his paternal grandfather also had a myocardial infarction believed to be in his 70s.  Patient does not know whether there was a genetic condition that led to his father's early age myocardial infarction.    Patient did have cholesterol levels drawn which were shown to be uncontrolled.  Patient does have a normal BMI, does not smoke, is not a diabetic.  He has had palpitations and anxiety and at one point wore a Zio patch which came back with normal findings.  He continues to struggle with anxiety and does use moderate amount of alcohol sometimes to help with his anxiety.          Lipids (Increased lipid levels and worried about pre-diabetes  Natalya Norwood LPN on 7/18/2023 at 9:49 AM//)    History of Present Illness       Reason for visit:  Follow up    He eats 2-3 servings of fruits and vegetables daily.He consumes 0 sweetened beverage(s) daily.He exercises with enough effort to increase his heart rate 9 or less minutes per day.  He exercises with enough effort to increase his heart rate 3 or less days per week.   He is taking medications regularly.    Today's PHQ-9         PHQ-9 Total Score: 4    PHQ-9 Q9 Thoughts of better off dead/self-harm past 2 weeks :   Not at all    How difficult have these problems made it for you to do your work, take care of things at home, or get along with other people: Somewhat difficult  Today's MAXWELL-7 Score: 12       Hyperlipidemia Follow-Up      Are you regularly taking any medication or supplement to lower your cholesterol?   No    Are you having muscle aches or other side effects that you think could be caused by your cholesterol lowering medication?  No        Review of Systems   Respiratory: Negative for cough and shortness of breath.    Cardiovascular: Negative for chest pain, palpitations and peripheral edema.   Psychiatric/Behavioral: The patient is nervous/anxious.    All other systems reviewed and are negative.           Objective   "  /72   Pulse 68   Temp 98.1  F (36.7  C)   Resp 20   Ht 1.905 m (6' 3\")   Wt 87.7 kg (193 lb 6 oz)   SpO2 95%   BMI 24.17 kg/m    Body mass index is 24.17 kg/m .  Physical Exam  Vitals reviewed.   Constitutional:       General: He is not in acute distress.     Appearance: He is normal weight. He is not ill-appearing or toxic-appearing.   Eyes:      General:         Right eye: No discharge.         Left eye: No discharge.      Conjunctiva/sclera: Conjunctivae normal.      Pupils: Pupils are equal, round, and reactive to light.   Neck:      Vascular: No carotid bruit.   Cardiovascular:      Rate and Rhythm: Normal rate and regular rhythm.      Pulses: Normal pulses.      Heart sounds: Normal heart sounds. No murmur heard.  Pulmonary:      Effort: Pulmonary effort is normal. No respiratory distress.      Breath sounds: Normal breath sounds. No wheezing or rhonchi.   Neurological:      Mental Status: He is alert.   Psychiatric:         Mood and Affect: Mood is anxious.        Results for orders placed or performed in visit on 07/18/23   Hemoglobin A1c     Status: Normal   Result Value Ref Range    Hemoglobin A1C 4.7 4.0 - 6.2 %   Extra Tube     Status: None    Narrative    The following orders were created for panel order Extra Tube.  Procedure                               Abnormality         Status                     ---------                               -----------         ------                     Extra Serum Separator Tu...[[id number]]                      Final result                 Please view results for these tests on the individual orders.   Extra Serum Separator Tube (SST)     Status: None   Result Value Ref Range    Hold Specimen JIC                            "

## 2023-07-19 PROBLEM — Z82.41 FAMILY HISTORY OF SUDDEN CARDIAC DEATH IN FATHER: Status: ACTIVE | Noted: 2023-07-19

## 2023-07-19 PROBLEM — Z82.49 FAMILY HISTORY OF EARLY CAD: Status: ACTIVE | Noted: 2023-07-19

## 2023-07-19 ASSESSMENT — ENCOUNTER SYMPTOMS
PALPITATIONS: 0
SHORTNESS OF BREATH: 0
COUGH: 0
NERVOUS/ANXIOUS: 1

## 2025-03-19 ENCOUNTER — OFFICE VISIT (OUTPATIENT)
Dept: FAMILY MEDICINE | Facility: OTHER | Age: 35
End: 2025-03-19
Attending: FAMILY MEDICINE
Payer: COMMERCIAL

## 2025-03-19 VITALS
HEART RATE: 82 BPM | OXYGEN SATURATION: 99 % | WEIGHT: 182.38 LBS | RESPIRATION RATE: 16 BRPM | DIASTOLIC BLOOD PRESSURE: 74 MMHG | TEMPERATURE: 96.4 F | SYSTOLIC BLOOD PRESSURE: 118 MMHG | HEIGHT: 74 IN | BODY MASS INDEX: 23.41 KG/M2

## 2025-03-19 DIAGNOSIS — R61 NIGHT SWEATS: ICD-10-CM

## 2025-03-19 DIAGNOSIS — L85.8 KERATOSIS PILARIS: ICD-10-CM

## 2025-03-19 DIAGNOSIS — Z00.00 ROUTINE HISTORY AND PHYSICAL EXAMINATION OF ADULT: Primary | ICD-10-CM

## 2025-03-19 DIAGNOSIS — Z13.220 LIPID SCREENING: ICD-10-CM

## 2025-03-19 DIAGNOSIS — Z13.1 DIABETES MELLITUS SCREENING: ICD-10-CM

## 2025-03-19 DIAGNOSIS — Z78.9 MODERATE ALCOHOL CONSUMPTION: ICD-10-CM

## 2025-03-19 LAB
ALBUMIN SERPL BCG-MCNC: 4.8 G/DL (ref 3.5–5.2)
ALP SERPL-CCNC: 82 U/L (ref 40–150)
ALT SERPL W P-5'-P-CCNC: 13 U/L (ref 0–70)
ANION GAP SERPL CALCULATED.3IONS-SCNC: 11 MMOL/L (ref 7–15)
AST SERPL W P-5'-P-CCNC: 19 U/L (ref 0–45)
BASOPHILS # BLD AUTO: 0 10E3/UL (ref 0–0.2)
BASOPHILS NFR BLD AUTO: 1 %
BILIRUB SERPL-MCNC: 0.5 MG/DL
BUN SERPL-MCNC: 14.8 MG/DL (ref 6–20)
CALCIUM SERPL-MCNC: 9.5 MG/DL (ref 8.8–10.4)
CHLORIDE SERPL-SCNC: 101 MMOL/L (ref 98–107)
CHOLEST SERPL-MCNC: 255 MG/DL
CREAT SERPL-MCNC: 1.1 MG/DL (ref 0.67–1.17)
EGFRCR SERPLBLD CKD-EPI 2021: 89 ML/MIN/1.73M2
EOSINOPHIL # BLD AUTO: 0 10E3/UL (ref 0–0.7)
EOSINOPHIL NFR BLD AUTO: 0 %
ERYTHROCYTE [DISTWIDTH] IN BLOOD BY AUTOMATED COUNT: 12.5 % (ref 10–15)
FASTING STATUS PATIENT QL REPORTED: NO
FASTING STATUS PATIENT QL REPORTED: NO
GLUCOSE SERPL-MCNC: 82 MG/DL (ref 70–99)
HCO3 SERPL-SCNC: 28 MMOL/L (ref 22–29)
HCT VFR BLD AUTO: 46.3 % (ref 40–53)
HDLC SERPL-MCNC: 69 MG/DL
HGB BLD-MCNC: 16.5 G/DL (ref 13.3–17.7)
IMM GRANULOCYTES # BLD: 0 10E3/UL
IMM GRANULOCYTES NFR BLD: 0 %
LDLC SERPL CALC-MCNC: 158 MG/DL
LYMPHOCYTES # BLD AUTO: 1.5 10E3/UL (ref 0.8–5.3)
LYMPHOCYTES NFR BLD AUTO: 23 %
MCH RBC QN AUTO: 32.5 PG (ref 26.5–33)
MCHC RBC AUTO-ENTMCNC: 35.6 G/DL (ref 31.5–36.5)
MCV RBC AUTO: 91 FL (ref 78–100)
MONOCYTES # BLD AUTO: 0.4 10E3/UL (ref 0–1.3)
MONOCYTES NFR BLD AUTO: 6 %
NEUTROPHILS # BLD AUTO: 4.6 10E3/UL (ref 1.6–8.3)
NEUTROPHILS NFR BLD AUTO: 70 %
NONHDLC SERPL-MCNC: 186 MG/DL
NRBC # BLD AUTO: 0 10E3/UL
NRBC BLD AUTO-RTO: 0 /100
PLATELET # BLD AUTO: 256 10E3/UL (ref 150–450)
POTASSIUM SERPL-SCNC: 4.5 MMOL/L (ref 3.4–5.3)
PROT SERPL-MCNC: 7.9 G/DL (ref 6.4–8.3)
RBC # BLD AUTO: 5.08 10E6/UL (ref 4.4–5.9)
SODIUM SERPL-SCNC: 140 MMOL/L (ref 135–145)
TRIGL SERPL-MCNC: 142 MG/DL
TSH SERPL DL<=0.005 MIU/L-ACNC: 3.01 UIU/ML (ref 0.3–4.2)
WBC # BLD AUTO: 6.6 10E3/UL (ref 4–11)

## 2025-03-19 SDOH — HEALTH STABILITY: PHYSICAL HEALTH: ON AVERAGE, HOW MANY DAYS PER WEEK DO YOU ENGAGE IN MODERATE TO STRENUOUS EXERCISE (LIKE A BRISK WALK)?: 3 DAYS

## 2025-03-19 ASSESSMENT — SOCIAL DETERMINANTS OF HEALTH (SDOH): HOW OFTEN DO YOU GET TOGETHER WITH FRIENDS OR RELATIVES?: MORE THAN THREE TIMES A WEEK

## 2025-03-19 ASSESSMENT — PAIN SCALES - GENERAL: PAINLEVEL_OUTOF10: NO PAIN (0)

## 2025-03-19 NOTE — NURSING NOTE
"Chief Complaint   Patient presents with    Physical       Initial /74   Pulse 82   Temp (!) 96.4  F (35.8  C) (Tympanic)   Resp 16   Ht 1.88 m (6' 2\")   Wt 82.7 kg (182 lb 6 oz)   SpO2 99%   BMI 23.42 kg/m   Estimated body mass index is 23.42 kg/m  as calculated from the following:    Height as of this encounter: 1.88 m (6' 2\").    Weight as of this encounter: 82.7 kg (182 lb 6 oz).  Medication Review: complete    The next two questions are to help us understand your food security.  If you are feeling you need any assistance in this area, we have resources available to support you today.          3/19/2025   SDOH- Food Insecurity   Within the past 12 months, did you worry that your food would run out before you got money to buy more? N   Within the past 12 months, did the food you bought just not last and you didn t have money to get more? N         Health Care Directive:  Patient does not have a Health Care Directive: Discussed advance care planning with patient; information given to patient to review.    Syeda Coleman LPN      "

## 2025-03-19 NOTE — PATIENT INSTRUCTIONS
Cerave SA - lotion  Can help with the keratosis pilaris - the bumpy skin.    Situational anxiety -  Propranolol (beta-blocker) can be effective at improving anxiety symptoms.

## 2025-03-19 NOTE — PROGRESS NOTES
Preventive Care Visit  [facility]  Yuridia Benitez DO, Family Medicine  Mar 19, 2025      Assessment & Plan     1. Routine history and physical examination of adult (Primary)    2. Lipid screening  3. Diabetes mellitus screening  Screening labs obtained today; with lipids returning with elevated LDL -- consideration for low dose statin between this results and + family history of early CAD.  Will be best risk reduction; along with reduction in EtOH and nicotine use.  Congratulated on goals and encouragement provided to benefit health.  - Lipid Panel; Future  - Lipid Panel  - Comprehensive Metabolic Panel; Future  - Comprehensive Metabolic Panel    4. Night sweats  Chronic, suspect EtOH related.  However, will obtain CBC, thyroid studies, CMP to evaluate for other causes.   Continue to monitor for improvement outside of acute stressor of decision to move to [place]; and along with reduction in EtOH/nicotine use.  - Lipid Panel; Future  - CBC and Differential; Future  - Comprehensive Metabolic Panel; Future  - TSH Reflex GH; Future  - Lipid Panel  - CBC and Differential  - Comprehensive Metabolic Panel  - TSH Reflex GH    5. Keratosis pilaris  Chronic, trial Cerave SA lotion to help minimize skin lesions.    6. Moderate alcohol consumption  Chronic, labs obtained - will monitor LFTs.  Recently made self-goals to reduce alcohol intake, nicotine.   Discussed health risks of ongoing moderate to heavy alcohol intake - including an increase in all cancers, CVD, etc.      Patient has been advised of split billing requirements and indicates understanding: Yes        Counseling  Appropriate preventive services were addressed with this patient via screening, questionnaire, or discussion as appropriate for fall prevention, nutrition, physical activity, Tobacco-use cessation, social engagement, weight loss and cognition.  Checklist reviewing preventive services available has been given to the  "patient.  Reviewed patient's diet, addressing concerns and/or questions.   He is at risk for lack of exercise and has been provided with information to increase physical activity for the benefit of his well-being.   He is at risk for psychosocial distress and has been provided with information to reduce risk.   The patient reports drinking more than 3 alcoholic drinks per day and/or more than 7 drhnks per week. The patient was counseled and given information about possible harmful effects of excessive alcohol intake.    Follow up: prn; pending above results.      Marielle Quiles is a 34 year old, presenting for the following:  Physical        3/19/2025     2:39 PM   Additional Questions   Roomed by TRAVIS Landa   Accompanied by self          HPI     Sweating at night.  Increased the last few weeks to months.  Typically up at 3am every morning in a sweat; then up for ~1 hour.  Overthinking.    Has chronic anxiety; but is very functional with it.  Tiring.  At times wishes he could \"care a little less\" about things.  4 months out of a relationship; looking at possibly moving to [place] for a job opportunity - but would be leaving behind ailing mother (parkinson's), a great network of friends and acquaintances for work, etc.    Does work with a therapist every other week (she is going on maternity leave soon) @ [facility].    Had met with IM re: possible options for statin therapy, CT coronary calcium score or stress testing -- and would like to work on lifestyle changes at this time.    Advance Care Planning  Patient does not have a Health Care Directive: Discussed advance care planning with patient; information given to patient to review.      3/19/2025   General Health   How would you rate your overall physical health? Excellent   Feel stress (tense, anxious, or unable to sleep) Rather much   (!) STRESS CONCERN      3/19/2025   Nutrition   Three or more servings of calcium each day? (!) NO   Diet: Regular (no " restrictions)   How many servings of fruit and vegetables per day? (!) 0-1   How many sweetened beverages each day? 0-1         3/19/2025   Exercise   Days per week of moderate/strenous exercise 3 days         3/19/2025   Social Factors   Frequency of gathering with friends or relatives More than three times a week   Worry food won't last until get money to buy more No   Food not last or not have enough money for food? No   Do you have housing? (Housing is defined as stable permanent housing and does not include staying ouside in a car, in a tent, in an abandoned building, in an overnight shelter, or couch-surfing.) Yes   Are you worried about losing your housing? No   Lack of transportation? No   Unable to get utilities (heat,electricity)? No         3/19/2025   Dental   Dentist two times every year? Yes     Today's PHQ-2 Score:       3/19/2025     2:35 PM   PHQ-2 ( 1999 [employer])   Q1: Little interest or pleasure in doing things 1   Q2: Feeling down, depressed or hopeless 1   PHQ-2 Score 2    Q1: Little interest or pleasure in doing things Several days   Q2: Feeling down, depressed or hopeless Several days   PHQ-2 Score 2       Patient-reported           3/19/2025   Substance Use   Alcohol more than 3/day or more than 7/wk Yes   How often do you have a drink containing alcohol 4 or more times a week   How many alcohol drinks on typical day 3 or 4   How often do you have 5+ drinks at one occasion Monthly   Audit 2/3 Score 3   How often not able to stop drinking once started Never   How often failed to do what normally expected Monthly   How often needed first drink in am after a heavy drinking session Never   How often feeling of guilt or remorse after drinking Monthly   How often unable to remember what happened the night before Never   Have you or someone else been injured because of your drinking No   Has anyone been concerned or suggested you cut down on drinking No   TOTAL SCORE - AUDIT 11   Do you use any other  substances recreationally? No     Social History     Tobacco Use    Smoking status: Never    Smokeless tobacco: Former    Tobacco comments:     Nicotine pouches    Vaping Use    Vaping status: Never Used   Substance Use Topics    Alcohol use: Yes     Alcohol/week: 12.0 standard drinks of alcohol     Types: 12 Standard drinks or equivalent per week     Comment: 3 a day     Drug use: Never           3/19/2025   STI Screening   New sexual partner(s) since last STI/HIV test? No         3/19/2025   Contraception/Family Planning   Questions about contraception or family planning No        Reviewed and updated as needed this visit by Provider   Tobacco  Allergies  Meds  Problems  Med Hx  Surg Hx  Fam Hx          PSA: @ 43  Colonoscopy: @ 43  FamHx: early heart disease.  Consider statin, Echo or CT coronary calcium screening.  Tdap: 6/23/23, does not receive flu, Shingrix @ 48; RSV @ 58; PNA @ 63.  Covid x3, declines boosters    History reviewed. No pertinent past medical history.  Past Surgical History:   Procedure Laterality Date    OTHER SURGICAL HISTORY      2002,600000,OTHER,involving closed reduction     OB History   No obstetric history on file.     BP Readings from Last 3 Encounters:   03/19/25 118/74   07/18/23 120/72   06/23/23 130/80    Wt Readings from Last 3 Encounters:   03/19/25 82.7 kg (182 lb 6 oz)   07/18/23 87.7 kg (193 lb 6 oz)   06/23/23 87.4 kg (192 lb 9.6 oz)                  Patient Active Problem List   Diagnosis    Trichotillomania    Anxiety    Family history of early CAD    Family history of sudden cardiac death in father     Past Surgical History:   Procedure Laterality Date    OTHER SURGICAL HISTORY      2002,600000,OTHER,involving closed reduction       Social History     Tobacco Use    Smoking status: Never    Smokeless tobacco: Former    Tobacco comments:     Nicotine pouches    Substance Use Topics    Alcohol use: Yes     Alcohol/week: 12.0 standard drinks of alcohol     Types: 12  "Standard drinks or equivalent per week     Comment: 3 a day      Family History   Problem Relation Age of Onset    Hypertension Mother     Anxiety Disorder Mother     Parkinsonism Mother     Heart Disease Father 46        Heart Disease,MI    Myocardial Infarction Father 46    Depression Brother     Alcoholism Brother     Inflammatory Bowel Disease Brother     Diabetes Brother         ???    Myocardial Infarction Paternal Grandfather 70 - 79    Cancer No family hx of         Cancer         No current outpatient medications on file.     Allergies   Allergen Reactions    Penicillins Anxiety    Nickel      metal        Objective    Exam  /74   Pulse 82   Temp (!) 96.4  F (35.8  C) (Tympanic)   Resp 16   Ht 1.88 m (6' 2\")   Wt 82.7 kg (182 lb 6 oz)   SpO2 99%   BMI 23.42 kg/m     Estimated body mass index is 23.42 kg/m  as calculated from the following:    Height as of this encounter: 1.88 m (6' 2\").    Weight as of this encounter: 82.7 kg (182 lb 6 oz).    Physical Exam  GENERAL: alert and no distress  EYES: Eyes grossly normal to inspection, PERRL and conjunctivae and sclerae normal  HENT: ear canals and TM's normal, nose and mouth without ulcers or lesions  NECK: no adenopathy, no asymmetry, masses, or scars  RESP: lungs clear to auscultation - no rales, rhonchi or wheezes  CV: regular rate and rhythm, normal S1 S2, no S3 or S4, no murmur, click or rub, no peripheral edema  ABDOMEN: soft, nontender, no hepatosplenomegaly, no masses and bowel sounds normal  MS: no gross musculoskeletal defects noted, no edema  SKIN: no suspicious lesions or rashes  NEURO: Normal strength and tone, mentation intact and speech normal        Signed Electronically by: Yuridia Benitez, DO    "

## 2025-03-20 PROBLEM — L85.8 KERATOSIS PILARIS: Status: ACTIVE | Noted: 2025-03-20

## 2025-03-20 PROBLEM — R61 NIGHT SWEATS: Status: ACTIVE | Noted: 2025-03-20

## 2025-03-20 PROBLEM — Z78.9 MODERATE ALCOHOL CONSUMPTION: Status: ACTIVE | Noted: 2025-03-20
